# Patient Record
Sex: FEMALE | Race: ASIAN | NOT HISPANIC OR LATINO
[De-identification: names, ages, dates, MRNs, and addresses within clinical notes are randomized per-mention and may not be internally consistent; named-entity substitution may affect disease eponyms.]

---

## 2020-07-13 PROBLEM — Z00.00 ENCOUNTER FOR PREVENTIVE HEALTH EXAMINATION: Status: ACTIVE | Noted: 2020-07-13

## 2020-07-14 ENCOUNTER — APPOINTMENT (OUTPATIENT)
Dept: OBGYN | Facility: CLINIC | Age: 35
End: 2020-07-14
Payer: MEDICAID

## 2020-07-14 VITALS
SYSTOLIC BLOOD PRESSURE: 119 MMHG | WEIGHT: 144 LBS | TEMPERATURE: 98 F | BODY MASS INDEX: 28.27 KG/M2 | HEIGHT: 60 IN | DIASTOLIC BLOOD PRESSURE: 79 MMHG | HEART RATE: 85 BPM

## 2020-07-14 DIAGNOSIS — Z83.3 FAMILY HISTORY OF DIABETES MELLITUS: ICD-10-CM

## 2020-07-14 DIAGNOSIS — N96 RECURRENT PREGNANCY LOSS: ICD-10-CM

## 2020-07-14 DIAGNOSIS — Z80.3 FAMILY HISTORY OF MALIGNANT NEOPLASM OF BREAST: ICD-10-CM

## 2020-07-14 DIAGNOSIS — Z78.9 OTHER SPECIFIED HEALTH STATUS: ICD-10-CM

## 2020-07-14 DIAGNOSIS — Z87.59 PERSONAL HISTORY OF OTHER COMPLICATIONS OF PREGNANCY, CHILDBIRTH AND THE PUERPERIUM: ICD-10-CM

## 2020-07-14 DIAGNOSIS — Z01.419 ENCOUNTER FOR GYNECOLOGICAL EXAMINATION (GENERAL) (ROUTINE) W/OUT ABNORMAL FINDINGS: ICD-10-CM

## 2020-07-14 DIAGNOSIS — N98.1 HYPERSTIMULATION OF OVARIES: ICD-10-CM

## 2020-07-14 PROCEDURE — 99203 OFFICE O/P NEW LOW 30 MIN: CPT | Mod: TH

## 2020-07-19 PROBLEM — N96 HISTORY OF RECURRENT MISCARRIAGES, NOT CURRENTLY PREGNANT: Status: RESOLVED | Noted: 2020-07-19 | Resolved: 2020-07-19

## 2020-07-19 PROBLEM — Z78.9 EXERCISES OCCASIONALLY: Status: ACTIVE | Noted: 2020-07-14

## 2020-07-19 PROBLEM — N98.1 OVARIAN HYPERSTIMULATION SYNDROME: Status: RESOLVED | Noted: 2020-07-19 | Resolved: 2020-07-19

## 2020-07-19 PROBLEM — Z80.3 FAMILY HISTORY OF MALIGNANT NEOPLASM OF BREAST: Status: ACTIVE | Noted: 2020-07-14

## 2020-07-19 PROBLEM — Z83.3 FAMILY HISTORY OF DIABETES MELLITUS: Status: ACTIVE | Noted: 2020-07-14

## 2020-07-19 PROBLEM — Z87.59 HISTORY OF ECTOPIC PREGNANCY: Status: RESOLVED | Noted: 2020-07-19 | Resolved: 2020-07-19

## 2020-07-19 RX ORDER — FOLIC ACID 20 MG
CAPSULE ORAL
Refills: 0 | Status: ACTIVE | COMMUNITY

## 2020-07-19 NOTE — HISTORY OF PRESENT ILLNESS
[Sexually Active] : is sexually active [Definite:  ___ (Date)] : the last menstrual period was [unfilled] [Menarche Age: ____] : age at menarche was [unfilled] [Male ___] : [unfilled] male [Regular Cycle Intervals] : periods have been regular [Monogamous] : is monogamous [Contraception] : does not use contraception

## 2020-07-19 NOTE — CHIEF COMPLAINT
[Initial Visit] : initial GYN visit [FreeTextEntry1] : Pt is here for initial visit. Overall pt feels good, pt has c/o that during last periods had pelvic pain and had more blood clots then usual.

## 2020-07-19 NOTE — COUNSELING
[Vitamins/Supplements] : vitamins/supplements [Exercise] : exercise [Nutrition] : nutrition [Contraception] : contraception [STD (testing, results, tx)] : STD (testing, results, tx) [Safe Sexual Practices] : safe sexual practices [Preconception Care] : preconception care

## 2020-07-19 NOTE — REVIEW OF SYSTEMS
[Frequency] : frequency [Nl] : Endocrine [Dysuria] : no dysuria [Incontinence] : no incontinence [Pelvic Pain] : no pelvic pain [Abn Vag Bleeding] : no abnormal vaginal bleeding [Urgency] : no urgency

## 2020-07-19 NOTE — PHYSICAL EXAM
[No Lesions] : no genitalia lesions [Normal] : external genitalia [Labia Majora] : labia major [Labia Minora] : labia minora [No Bleeding] : there was no active vaginal bleeding [Uterine Adnexae] : were not tender and not enlarged [Discharge] : had no discharge [Motion Tenderness] : there was no cervical motion tenderness [Tenderness] : nontender [Enlarged ___ wks] : not enlarged [Mass ___ cm] : no uterine mass was palpated

## 2020-09-22 ENCOUNTER — APPOINTMENT (OUTPATIENT)
Dept: OBGYN | Facility: CLINIC | Age: 35
End: 2020-09-22

## 2020-12-23 PROBLEM — Z01.419 ENCOUNTER FOR GYNECOLOGICAL EXAMINATION: Status: RESOLVED | Noted: 2020-07-19 | Resolved: 2020-12-23

## 2021-07-07 ENCOUNTER — APPOINTMENT (OUTPATIENT)
Dept: OBGYN | Facility: CLINIC | Age: 36
End: 2021-07-07
Payer: MEDICAID

## 2021-07-07 VITALS
HEIGHT: 60 IN | TEMPERATURE: 98.1 F | DIASTOLIC BLOOD PRESSURE: 79 MMHG | BODY MASS INDEX: 27.88 KG/M2 | HEART RATE: 82 BPM | SYSTOLIC BLOOD PRESSURE: 118 MMHG | WEIGHT: 142 LBS

## 2021-07-07 DIAGNOSIS — Z01.419 ENCOUNTER FOR GYNECOLOGICAL EXAMINATION (GENERAL) (ROUTINE) W/OUT ABNORMAL FINDINGS: ICD-10-CM

## 2021-07-07 PROCEDURE — 99395 PREV VISIT EST AGE 18-39: CPT

## 2021-07-09 LAB
C TRACH RRNA SPEC QL NAA+PROBE: NOT DETECTED
ESTRADIOL SERPL-MCNC: 123 PG/ML
FSH SERPL-MCNC: 3.1 IU/L
HPV HIGH+LOW RISK DNA PNL CVX: NOT DETECTED
LH SERPL-ACNC: 4.9 IU/L
N GONORRHOEA RRNA SPEC QL NAA+PROBE: NOT DETECTED
PROGEST SERPL-MCNC: 8.3 NG/ML
SOURCE AMPLIFICATION: NORMAL
TSH SERPL-ACNC: 2.51 UIU/ML

## 2021-07-12 LAB
TESTOST BND SERPL-MCNC: 4.3 PG/ML
TESTOSTERONE TOTAL S: 37 NG/DL

## 2021-07-13 LAB
SOURCE AMPLIFICATION: NORMAL
T VAGINALIS RRNA SPEC QL NAA+PROBE: NOT DETECTED

## 2021-07-27 LAB
CYTOLOGY CVX/VAG DOC THIN PREP: NORMAL
ESTROGEN SERPL-MCNC: 312 PG/ML

## 2021-08-01 PROBLEM — Z01.419 ENCOUNTER FOR ANNUAL ROUTINE GYNECOLOGICAL EXAMINATION: Status: RESOLVED | Noted: 2021-08-01 | Resolved: 2021-08-15

## 2021-08-01 NOTE — HISTORY OF PRESENT ILLNESS
[Currently Active] : currently active [Men] : men [N] : Patient does not use contraception [Y] : Positive pregnancy history [Normal Amount/Duration] :  normal amount and duration [Regular Cycle Intervals] : periods have been regular [Frequency: Q ___ days] : menstrual periods occur approximately every [unfilled] days [Menarche Age: ____] : age at menarche was [unfilled] [Gonorrhea test offered] : Gonorrhea test offered [Chlamydia test offered] : Chlamydia test offered [Trichomonas test offered] : Trichomonas test offered [HPV test offered] : HPV test offered [No] : No [TextBox_4] : 37yo  with LMP 6/20/2021 came fro annual GYN exam and pap smear.  She also has concerns about fertility and her painful periods due to fibroids.  She denies discharge or dysuria.  She denies h/o abnl doug/HPV or STDs.  She is sexually active with one male partner- , has been trying to conceive without success, planning to start IVF again in future.  \par \par Patient had genetic testing done with previous [facility], unsure of what all was tested.  Patient unsure if hereditary cancer gene testing was done, recommended to do since mother has breast cancer at 35yoa.  Will request records and do all tests that were not previously looked at due to multiple SAB as well as hereditary disease.   [LMPDate] : 6/20/21 [MensesFreq] : 28 [MensesLength] : 5 [MensesAmount] : nl flow  [PGxTotal] : 6 [PGHxFullTerm] : 0 [PGHxPremature] : 0 [PGHxAbortions] : 6 [[facility]xLiving] : 0 [PGHxABInduced] : 0 [PGHxABSpont] : 5 [PGxEctopic] : 1 [PGHxMultBirths] : 0 [FreeTextEntry1] : 6/20/21

## 2021-08-01 NOTE — COUNSELING
[Nutrition/ Exercise/ Weight Management] : nutrition, exercise, weight management [Vitamins/Supplements] : vitamins/supplements [Breast Self Exam] : breast self exam [Bladder Hygiene] : bladder hygiene [Contraception/ Emergency Contraception/ Safe Sexual Practices] : contraception, emergency contraception, safe sexual practices [Preconception Care/ Fertility options] : preconception care, fertility options [STD (testing, results, tx)] : STD (testing, results, tx) [Medication Management] : medication management

## 2021-08-01 NOTE — PHYSICAL EXAM
[Appropriately responsive] : appropriately responsive [Alert] : alert [No Acute Distress] : no acute distress [No Lymphadenopathy] : no lymphadenopathy [Regular Rate Rhythm] : regular rate rhythm [Soft] : soft [Non-tender] : non-tender [Non-distended] : non-distended [No Mass] : no mass [Oriented x3] : oriented x3 [Examination Of The Breasts] : a normal appearance [No Discharge] : no discharge [No Masses] : no breast masses were palpable [No Lesions] : no lesions  [Labia Majora] : normal [Labia Minora] : normal [No Bleeding] : There was no active vaginal bleeding [Normal] : normal [Tenderness] : nontender [Enlarged ___ wks] : not enlarged [Mass ___ cm] : no uterine mass was palpated [Uterine Adnexae] : normal [FreeTextEntry5] : pap smear done

## 2021-08-01 NOTE — DISCUSSION/SUMMARY
[FreeTextEntry1] : A: 35yo for annual GYN exam, dysmenorrhea, fibroids, infertility, BMI 27\par \par P: GYN exam done\par      pap sear done\par      hormones\par      referral for pelvic sonogram\par      referral for mammogram/sonogram\par      request records\par      recommend hereditary cancer screen testing if not done\par      preconception counseling done\par      pain and bleeding precautions\par      f/u after imaging or PRN

## 2021-09-01 LAB — ANTI-MUELLERIAN HORMONE: 2.59 NG/ML

## 2021-09-09 LAB — HCG SERPL-MCNC: <0.6 MIU/ML

## 2022-05-31 ENCOUNTER — APPOINTMENT (OUTPATIENT)
Dept: OBGYN | Facility: CLINIC | Age: 37
End: 2022-05-31

## 2022-05-31 VITALS
WEIGHT: 134 LBS | TEMPERATURE: 97.2 F | SYSTOLIC BLOOD PRESSURE: 89 MMHG | BODY MASS INDEX: 26.31 KG/M2 | HEART RATE: 71 BPM | DIASTOLIC BLOOD PRESSURE: 57 MMHG | HEIGHT: 60 IN

## 2022-05-31 PROCEDURE — 99214 OFFICE O/P EST MOD 30 MIN: CPT | Mod: TH

## 2022-07-16 NOTE — DISCUSSION/SUMMARY
[FreeTextEntry1] : A: 37-year-old with fibroid uterus and surgical consultation, dysmenorrhea and heavy menses, fertility counseling, history of recurrent SAB, BMI 26\par \par P: Surgical and preconception counseling done\par MRI referral given\par Encourage [facility] follow-up for fertility planning\par Pain and bleeding precautions\par Encourage healthy diet and exercise\par Follow-up after imaging or for routine exam or as needed

## 2022-07-16 NOTE — HISTORY OF PRESENT ILLNESS
[Menarche Age: ____] : age at menarche was [unfilled] [Currently Active] : currently active [Men] : men [No] : No [Normal Amount/Duration] :  normal amount and duration [Regular Cycle Intervals] : periods have been regular [Frequency: Q ___ days] : menstrual periods occur approximately every [unfilled] days [Patient reported PAP Smear was normal] : Patient reported PAP Smear was normal [TextBox_4] : 37-year-old para 0-0-6-0 with LMP 5/30/2022 came for consultation regarding fibroids and possible myomectomy to help with her fertility.  She states her menses has been coming monthly however she gets pain near the end of her menses more often.  She has a known fibroid uterus and has gone through previous assisted fertility with no success.  They had previously had genetic infertility work-up with 9 frozen embryos available.  Last evaluation with [facility] was prior to 2020.  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  There were no other fibroids noted and patient was counseled that this may not hinder fertility and surgery could cause further complications potentially.  We did discuss getting a pelvic MRI to assess her uterus and fibroid further to evaluate whether myomectomy would be in her best interest prior to IVF and assisted fertility.  I also encourage patient to follow-up with [facility] regarding this and how they feel about the best options for her assisted fertility and use of her embryos.  Patient and  had many questions which were answered satisfactorily.  Patient is in agreement with management plan and given referral for pelvic MRI and states will make an appointment with [facility] in the meantime. [PapSmeardate] : 7/2021 [FreeTextEntry1] : 5/30/22

## 2022-07-16 NOTE — COUNSELING
[Nutrition/ Exercise/ Weight Management] : nutrition, exercise, weight management [Vitamins/Supplements] : vitamins/supplements [Preconception Care/ Fertility options] : preconception care, fertility options [Lab Results] : lab results [Medication Management] : medication management [Pre/Post Op Instructions] : pre/post op instructions

## 2022-07-16 NOTE — PHYSICAL EXAM
[Appropriately responsive] : appropriately responsive [Alert] : alert [No Acute Distress] : no acute distress [Oriented x3] : oriented x3 [FreeTextEntry2] : Patient declined exam today due to no current complaints and has scheduled annual exam in July 2022

## 2022-07-19 ENCOUNTER — NON-APPOINTMENT (OUTPATIENT)
Age: 37
End: 2022-07-19

## 2022-08-25 ENCOUNTER — APPOINTMENT (OUTPATIENT)
Dept: OBGYN | Facility: CLINIC | Age: 37
End: 2022-08-25

## 2022-08-25 DIAGNOSIS — N64.4 MASTODYNIA: ICD-10-CM

## 2022-08-25 PROCEDURE — 99442: CPT

## 2022-08-28 PROBLEM — N64.4 BREAST PAIN: Status: ACTIVE | Noted: 2022-08-28

## 2022-09-02 ENCOUNTER — ASOB RESULT (OUTPATIENT)
Age: 37
End: 2022-09-02

## 2022-09-02 ENCOUNTER — APPOINTMENT (OUTPATIENT)
Dept: OBGYN | Facility: CLINIC | Age: 37
End: 2022-09-02
Payer: MEDICAID

## 2022-09-02 ENCOUNTER — APPOINTMENT (OUTPATIENT)
Dept: OBGYN | Facility: CLINIC | Age: 37
End: 2022-09-02

## 2022-09-02 VITALS
HEART RATE: 93 BPM | WEIGHT: 138 LBS | TEMPERATURE: 97.7 F | SYSTOLIC BLOOD PRESSURE: 110 MMHG | HEIGHT: 60 IN | DIASTOLIC BLOOD PRESSURE: 72 MMHG | BODY MASS INDEX: 27.09 KG/M2

## 2022-09-02 DIAGNOSIS — Z86.018 PERSONAL HISTORY OF OTHER BENIGN NEOPLASM: ICD-10-CM

## 2022-09-02 DIAGNOSIS — Z12.4 ENCOUNTER FOR SCREENING FOR MALIGNANT NEOPLASM OF CERVIX: ICD-10-CM

## 2022-09-02 LAB
BILIRUB UR QL STRIP: NORMAL
CLARITY UR: CLEAR
COLLECTION METHOD: NORMAL
GLUCOSE UR-MCNC: NORMAL
HCG UR QL: 0.2 EU/DL
HCG UR QL: POSITIVE
HGB UR QL STRIP.AUTO: NORMAL
KETONES UR-MCNC: NORMAL
LEUKOCYTE ESTERASE UR QL STRIP: ABNORMAL
NITRITE UR QL STRIP: NORMAL
PH UR STRIP: 7
PROT UR STRIP-MCNC: NORMAL
QUALITY CONTROL: YES
SP GR UR STRIP: 1.01

## 2022-09-02 PROCEDURE — 76830 TRANSVAGINAL US NON-OB: CPT | Mod: 59

## 2022-09-02 PROCEDURE — 99395 PREV VISIT EST AGE 18-39: CPT

## 2022-09-02 PROCEDURE — ZZZZZ: CPT

## 2022-09-02 PROCEDURE — 81025 URINE PREGNANCY TEST: CPT

## 2022-09-02 PROCEDURE — 81003 URINALYSIS AUTO W/O SCOPE: CPT | Mod: QW

## 2022-09-02 RX ORDER — CEPHALEXIN 500 MG/1
500 CAPSULE ORAL 4 TIMES DAILY
Qty: 28 | Refills: 0 | Status: ACTIVE | COMMUNITY
Start: 2022-09-02 | End: 1900-01-01

## 2022-09-02 RX ORDER — KRILL/OM-3/DHA/EPA/PHOSPHO/AST 1000-230MG
81 CAPSULE ORAL DAILY
Qty: 30 | Refills: 8 | Status: ACTIVE | COMMUNITY
Start: 2022-09-02 | End: 1900-01-01

## 2022-09-02 NOTE — REASON FOR VISIT
[Follow-Up] : a follow-up evaluation of [FreeTextEntry2] : confirm pregnancy  [Annual] : an annual visit.

## 2022-09-06 ENCOUNTER — APPOINTMENT (OUTPATIENT)
Dept: OBGYN | Facility: CLINIC | Age: 37
End: 2022-09-06
Payer: MEDICAID

## 2022-09-06 VITALS
HEIGHT: 60 IN | WEIGHT: 139 LBS | TEMPERATURE: 98.6 F | DIASTOLIC BLOOD PRESSURE: 71 MMHG | BODY MASS INDEX: 27.29 KG/M2 | SYSTOLIC BLOOD PRESSURE: 106 MMHG | HEART RATE: 74 BPM

## 2022-09-06 DIAGNOSIS — R30.0 DYSURIA: ICD-10-CM

## 2022-09-06 LAB
ABO + RH PNL BLD: NORMAL
ANION GAP SERPL CALC-SCNC: 10 MMOL/L
BACTERIA UR CULT: NORMAL
BASOPHILS # BLD AUTO: 0.02 K/UL
BASOPHILS NFR BLD AUTO: 0.3 %
BILIRUB UR QL STRIP: NORMAL
BLD GP AB SCN SERPL QL: NORMAL
BUN SERPL-MCNC: 6 MG/DL
CALCIUM SERPL-MCNC: 10.1 MG/DL
CHLORIDE SERPL-SCNC: 103 MMOL/L
CLARITY UR: CLEAR
CO2 SERPL-SCNC: 24 MMOL/L
COLLECTION METHOD: NORMAL
CREAT SERPL-MCNC: 0.5 MG/DL
EGFR: 125 ML/MIN/1.73M2
EOSINOPHIL # BLD AUTO: 0.05 K/UL
EOSINOPHIL NFR BLD AUTO: 0.8 %
GLUCOSE SERPL-MCNC: 75 MG/DL
GLUCOSE UR-MCNC: NORMAL
HCG SERPL-MCNC: ABNORMAL MIU/ML
HCG UR QL: 0.2 EU/DL
HCT VFR BLD CALC: 41.4 %
HGB BLD-MCNC: 12.7 G/DL
HGB UR QL STRIP.AUTO: ABNORMAL
IMM GRANULOCYTES NFR BLD AUTO: 0.2 %
KETONES UR-MCNC: NORMAL
LEUKOCYTE ESTERASE UR QL STRIP: ABNORMAL
LYMPHOCYTES # BLD AUTO: 1.83 K/UL
LYMPHOCYTES NFR BLD AUTO: 27.9 %
MAN DIFF?: NORMAL
MCHC RBC-ENTMCNC: 28.5 PG
MCHC RBC-ENTMCNC: 30.7 G/DL
MCV RBC AUTO: 93 FL
MONOCYTES # BLD AUTO: 0.52 K/UL
MONOCYTES NFR BLD AUTO: 7.9 %
NEUTROPHILS # BLD AUTO: 4.14 K/UL
NEUTROPHILS NFR BLD AUTO: 62.9 %
NITRITE UR QL STRIP: NORMAL
PH UR STRIP: 7
PLATELET # BLD AUTO: 229 K/UL
POTASSIUM SERPL-SCNC: 5.2 MMOL/L
PROGEST SERPL-MCNC: 14.2 NG/ML
PROT UR STRIP-MCNC: NORMAL
RBC # BLD: 4.45 M/UL
RBC # FLD: 14.5 %
SODIUM SERPL-SCNC: 137 MMOL/L
SP GR UR STRIP: 1.01
WBC # FLD AUTO: 6.57 K/UL

## 2022-09-06 PROCEDURE — 76830 TRANSVAGINAL US NON-OB: CPT | Mod: 59

## 2022-09-06 PROCEDURE — 99214 OFFICE O/P EST MOD 30 MIN: CPT | Mod: TH

## 2022-09-06 PROCEDURE — 81003 URINALYSIS AUTO W/O SCOPE: CPT | Mod: QW

## 2022-09-06 RX ORDER — HUMAN RHO(D) IMMUNE GLOBULIN 300 UG/1
1500 INJECTION, SOLUTION INTRAMUSCULAR
Refills: 0 | Status: COMPLETED | OUTPATIENT
Start: 2022-09-06

## 2022-09-06 RX ADMIN — HUMAN RHO(D) IMMUNE GLOBULIN 0 UNIT: 300 INJECTION, SOLUTION INTRAMUSCULAR at 00:00

## 2022-09-08 LAB
A VAGINAE DNA VAG QL NAA+PROBE: NORMAL
BVAB2 DNA VAG QL NAA+PROBE: NORMAL
C KRUSEI DNA VAG QL NAA+PROBE: NEGATIVE
C TRACH RRNA SPEC QL NAA+PROBE: NEGATIVE
HCG SERPL-MCNC: ABNORMAL MIU/ML
HPV HIGH+LOW RISK DNA PNL CVX: NOT DETECTED
MEGA1 DNA VAG QL NAA+PROBE: NORMAL
N GONORRHOEA RRNA SPEC QL NAA+PROBE: NEGATIVE
PROGEST SERPL-MCNC: 16 NG/ML
T VAGINALIS RRNA SPEC QL NAA+PROBE: NEGATIVE

## 2022-09-14 LAB — CYTOLOGY CVX/VAG DOC THIN PREP: NORMAL

## 2022-09-20 ENCOUNTER — APPOINTMENT (OUTPATIENT)
Dept: OBGYN | Facility: CLINIC | Age: 37
End: 2022-09-20
Payer: MEDICAID

## 2022-09-20 ENCOUNTER — LABORATORY RESULT (OUTPATIENT)
Age: 37
End: 2022-09-20

## 2022-09-20 VITALS
SYSTOLIC BLOOD PRESSURE: 93 MMHG | HEART RATE: 83 BPM | BODY MASS INDEX: 27.48 KG/M2 | HEIGHT: 60 IN | TEMPERATURE: 97.6 F | DIASTOLIC BLOOD PRESSURE: 66 MMHG | WEIGHT: 140 LBS

## 2022-09-20 DIAGNOSIS — Z31.69 ENCOUNTER FOR OTHER GENERAL COUNSELING AND ADVICE ON PROCREATION: ICD-10-CM

## 2022-09-20 DIAGNOSIS — Z71.89 OTHER SPECIFIED COUNSELING: ICD-10-CM

## 2022-09-20 LAB
BILIRUB UR QL STRIP: NORMAL
CLARITY UR: CLEAR
COLLECTION METHOD: NORMAL
GLUCOSE UR-MCNC: NORMAL
HCG UR QL: 0.2 EU/DL
HGB UR QL STRIP.AUTO: NORMAL
KETONES UR-MCNC: NORMAL
LEUKOCYTE ESTERASE UR QL STRIP: NORMAL
NITRITE UR QL STRIP: NORMAL
PH UR STRIP: 6.5
PROT UR STRIP-MCNC: NORMAL
SP GR UR STRIP: <=1.005

## 2022-09-20 PROCEDURE — 81003 URINALYSIS AUTO W/O SCOPE: CPT | Mod: QW

## 2022-09-20 PROCEDURE — 99212 OFFICE O/P EST SF 10 MIN: CPT | Mod: TH

## 2022-09-20 NOTE — HISTORY OF PRESENT ILLNESS
[Normal Amount/Duration] :  normal amount and duration [Regular Cycle Intervals] : periods have been regular [Menarche Age: ____] : age at menarche was [unfilled] [No] : Patient does not have concerns regarding sex [Currently Active] : currently active [Frequency: Q ___ days] : menstrual periods occur approximately every [unfilled] days [HCG+: ___(Date)] : a positive HCG was recorded on [unfilled] [FreeTextEntry1] : 7/25/21

## 2022-09-21 LAB
25(OH)D3 SERPL-MCNC: 29 NG/ML
ABO + RH PNL BLD: NORMAL
ANION GAP SERPL CALC-SCNC: 13 MMOL/L
BASOPHILS # BLD AUTO: 0.02 K/UL
BASOPHILS NFR BLD AUTO: 0.2 %
BLD GP AB SCN SERPL QL: NORMAL
BUN SERPL-MCNC: 9 MG/DL
CALCIUM SERPL-MCNC: 10 MG/DL
CHLORIDE SERPL-SCNC: 101 MMOL/L
CMV IGG SERPL QL: >10 U/ML
CMV IGG SERPL-IMP: POSITIVE
CMV IGM SERPL QL: <8 AU/ML
CMV IGM SERPL QL: NEGATIVE
CO2 SERPL-SCNC: 22 MMOL/L
CREAT SERPL-MCNC: 0.5 MG/DL
EGFR: 125 ML/MIN/1.73M2
EOSINOPHIL # BLD AUTO: 0.05 K/UL
EOSINOPHIL NFR BLD AUTO: 0.6 %
ESTIMATED AVERAGE GLUCOSE: 103 MG/DL
GLUCOSE SERPL-MCNC: 84 MG/DL
HBA1C MFR BLD HPLC: 5.2 %
HBV SURFACE AG SER QL: NONREACTIVE
HCG SERPL-MCNC: ABNORMAL MIU/ML
HCT VFR BLD CALC: 37.7 %
HCV AB SER QL: NONREACTIVE
HCV S/CO RATIO: 0.1 S/CO
HGB BLD-MCNC: 11.9 G/DL
HIV1+2 AB SPEC QL IA.RAPID: NONREACTIVE
HSV 1+2 IGG SER IA-IMP: NEGATIVE
HSV 1+2 IGG SER IA-IMP: POSITIVE
HSV1 IGG SER QL: 55.2 INDEX
HSV2 IGG SER QL: 0.06 INDEX
IMM GRANULOCYTES NFR BLD AUTO: 0.2 %
LYMPHOCYTES # BLD AUTO: 2.3 K/UL
LYMPHOCYTES NFR BLD AUTO: 27.1 %
MAN DIFF?: NORMAL
MCHC RBC-ENTMCNC: 29.2 PG
MCHC RBC-ENTMCNC: 31.6 G/DL
MCV RBC AUTO: 92.4 FL
MEV IGG FLD QL IA: >300 AU/ML
MEV IGG+IGM SER-IMP: POSITIVE
MONOCYTES # BLD AUTO: 0.51 K/UL
MONOCYTES NFR BLD AUTO: 6 %
MUV AB SER-ACNC: POSITIVE
MUV IGG SER QL IA: 95.3 AU/ML
NEUTROPHILS # BLD AUTO: 5.6 K/UL
NEUTROPHILS NFR BLD AUTO: 65.9 %
PLATELET # BLD AUTO: 238 K/UL
POTASSIUM SERPL-SCNC: 4.6 MMOL/L
PROGEST SERPL-MCNC: 12.2 NG/ML
RBC # BLD: 4.08 M/UL
RBC # FLD: 14.5 %
RUBV IGG FLD-ACNC: 21.2 INDEX
RUBV IGG SER-IMP: POSITIVE
SODIUM SERPL-SCNC: 136 MMOL/L
T GONDII AB SER-IMP: NEGATIVE
T GONDII AB SER-IMP: NEGATIVE
T GONDII IGG SER QL: <3 IU/ML
T GONDII IGM SER QL: <3 AU/ML
T PALLIDUM AB SER QL IA: NEGATIVE
TSH SERPL-ACNC: 1.78 UIU/ML
VZV AB TITR SER: POSITIVE
VZV IGG SER IF-ACNC: 666.7 INDEX
WBC # FLD AUTO: 8.5 K/UL

## 2022-09-22 LAB
BACTERIA UR CULT: NORMAL
HGB A MFR BLD: 97.2 %
HGB A2 MFR BLD: 2.8 %
HGB FRACT BLD-IMP: NORMAL
LEAD BLD-MCNC: <1 UG/DL

## 2022-09-22 RX ORDER — UBIDECARENONE/VIT E ACET 100MG-5
25 MCG CAPSULE ORAL
Qty: 30 | Refills: 2 | Status: ACTIVE | COMMUNITY
Start: 2022-09-22 | End: 1900-01-01

## 2022-09-23 LAB
B19V IGG SER QL IA: 0.39 INDEX
B19V IGG+IGM SER-IMP: NEGATIVE
B19V IGG+IGM SER-IMP: NORMAL
B19V IGM FLD-ACNC: 0.1 INDEX
B19V IGM SER-ACNC: NEGATIVE

## 2022-09-24 LAB — FMR1 GENE MUT ANL BLD/T: NORMAL

## 2022-09-25 PROBLEM — Z12.4 ENCOUNTER FOR SCREENING FOR CERVICAL CANCER: Status: ACTIVE | Noted: 2021-07-07

## 2022-09-25 PROBLEM — Z86.018 HISTORY OF FIBROID: Status: RESOLVED | Noted: 2022-09-25 | Resolved: 2022-09-25

## 2022-09-25 RX ORDER — NAPROXEN 500 MG/1
500 TABLET ORAL 3 TIMES DAILY
Qty: 90 | Refills: 0 | Status: DISCONTINUED | COMMUNITY
Start: 2020-07-19 | End: 2022-09-25

## 2022-09-25 RX ORDER — FLUCONAZOLE 150 MG/1
150 TABLET ORAL
Qty: 1 | Refills: 0 | Status: DISCONTINUED | COMMUNITY
Start: 2021-09-16 | End: 2022-09-25

## 2022-09-25 NOTE — HISTORY OF PRESENT ILLNESS
[Patient reported PAP Smear was normal] : Patient reported PAP Smear was normal [N] : Patient does not use contraception [Y] : Positive pregnancy history [Normal Amount/Duration] :  normal amount and duration [Regular Cycle Intervals] : periods have been regular [Frequency: Q ___ days] : menstrual periods occur approximately every [unfilled] days [Menarche Age: ____] : age at menarche was [unfilled] [HCG+: ___(Date)] : a positive HCG was recorded on [unfilled] [Currently Active] : currently active [Men] : men [No] : No [Gonorrhea test offered] : Gonorrhea test offered [Chlamydia test offered] : Chlamydia test offered [Trichomonas test offered] : Trichomonas test offered [HPV test offered] : HPV test offered [TextBox_4] : 37-year-old para 0-0-6-0 with LMP 7/25/2022 came for annual GYN exam and Pap smear and also to evaluate for pregnancy due to home pregnancy test positive.  She states she has been having a lot of pelvic pain and cramping and had spotting a couple days ago.  She denies any discharge but does have some dysuria.  She denies history of abnormal Pap, HPV, STDs or cysts.  She does have a known history of fibroid uterus.  She is sexually active with 1 male partner-.  Patient has been contemplating restarting her IVF at our last discussion however this was a natural pregnancy.\par \par UCG positive\par U dip moderate leukocytes [PapSmeardate] : 2021 [LMPDate] : 7/25/22 [MensesFreq] : 28 [MensesLength] : 6 [MensesAmount] : heavy to light  [PGxTotal] : 6 [PGHxFullTerm] : 0 [PGHxPremature] : 0 [PGHxAbortions] : 6 [[facility]xLiving] : 0 [PGHxABInduced] : 0 [PGHxABSpont] : 6 [PGHxEctopic] : 0 [PGHxMultBirths] : 0 [FreeTextEntry1] : 7/25/22

## 2022-09-25 NOTE — PHYSICAL EXAM
[Appropriately responsive] : appropriately responsive [Alert] : alert [No Acute Distress] : no acute distress [No Lymphadenopathy] : no lymphadenopathy [Regular Rate Rhythm] : regular rate rhythm [Soft] : soft [Non-tender] : non-tender [Non-distended] : non-distended [No Mass] : no mass [Oriented x3] : oriented x3 [Examination Of The Breasts] : a normal appearance [No Discharge] : no discharge [No Masses] : no breast masses were palpable [No Lesions] : no lesions  [Labia Majora] : normal [Labia Minora] : normal [Normal] : normal [Scant] : There was scant vaginal bleeding [Old Blood] : old blood was present in the vagina [Uterine Adnexae] : normal [FreeTextEntry4] : Scant old blood in vault no active bleeding [FreeTextEntry5] : Pap smear done [FreeTextEntry6] : Mild suprapubic tenderness on bimanual exam, no rebound or guarding, uterus 12 to 14 weeks size enlarged from fibroid

## 2022-09-25 NOTE — DISCUSSION/SUMMARY
[FreeTextEntry1] : Transvaginal ultrasound done to evaluate pregnancy and viability.  Patient counseled that a gestational sac with yolk sac was noted however no fetal pole visualized.  There were no adnexal masses or free fluid appreciated.  Patient was counseled on possibility of early gestation versus possible Ms. due to her pain and spotting.  This is a very desired pregnancy so we will trend beta-hCG and strict pain and bleeding precautions given.  Due to patient's dysuria and leukocytes and pain will start treatment for UTI and send urine culture.  We discussed potentially giving RhoGAM however without fetal pole present and unsure of pregnancy viability we will do type and screen today to evaluate for antibodies already and give RhoGAM as needed.  Patient understood all counseling and all questions answered satisfactorily.  Patient will trend hCG and follow-up as recommended.\par \par A: 37-year-old for annual GYN exam, amenorrhea, bleeding in early pregnancy, fibroid uterus, dysuria, BMI 27\par \par P: GYN exam done\par Pap smear done\par hCG and basic labs done\par Safe sex practices\par Pain and bleeding precautions\par Tylenol pain management as needed\par Keflex Rx sent\par Continue prenatal vitamins and folic acid\par Increase oral hydration\par Follow-up 48 hours for hCG and sonogram as needed

## 2022-09-25 NOTE — COUNSELING
[Nutrition/ Exercise/ Weight Management] : nutrition, exercise, weight management [Vitamins/Supplements] : vitamins/supplements [Breast Self Exam] : breast self exam [Bladder Hygiene] : bladder hygiene [Contraception/ Emergency Contraception/ Safe Sexual Practices] : contraception, emergency contraception, safe sexual practices [Pregnancy Options] : pregnancy options [STD (testing, results, tx)] : STD (testing, results, tx) [Lab Results] : lab results [Medication Management] : medication management

## 2022-09-25 NOTE — HISTORY OF PRESENT ILLNESS
[Patient reported PAP Smear was normal] : Patient reported PAP Smear was normal [N] : Patient does not use contraception [Y] : Positive pregnancy history [Normal Amount/Duration] :  normal amount and duration [Regular Cycle Intervals] : periods have been regular [Frequency: Q ___ days] : menstrual periods occur approximately every [unfilled] days [Menarche Age: ____] : age at menarche was [unfilled] [HCG+: ___(Date)] : a positive HCG was recorded on [unfilled] [Currently Active] : currently active [Men] : men [No] : No [Gonorrhea test offered] : Gonorrhea test offered [Chlamydia test offered] : Chlamydia test offered [Trichomonas test offered] : Trichomonas test offered [HPV test offered] : HPV test offered [TextBox_4] : 37-year-old para 0-0-6-0 with LMP 7/25/2022 came for annual GYN exam and Pap smear and also to evaluate for pregnancy due to home pregnancy test positive.  She states she has been having a lot of pelvic pain and cramping and had spotting a couple days ago.  She denies any discharge but does have some dysuria.  She denies history of abnormal Pap, HPV, STDs or cysts.  She does have a known history of fibroid uterus.  She is sexually active with 1 male partner-.  Patient has been contemplating restarting her IVF at our last discussion however this was a natural pregnancy.\par \par UCG positive\par U dip moderate leukocytes [PapSmeardate] : 2021 [LMPDate] : 7/25/22 [MensesFreq] : 28 [MensesLength] : 6 [MensesAmount] : heavy to light  [PGxTotal] : 6 [PGHxFullTerm] : 0 [PGHxPremature] : 0 [PGHxAbortions] : 6 [[facility]xLiving] : 0 [PGHxABInduced] : 0 [PGHxABSpont] : 6 [PGHxEctopic] : 0 [PGHxMultBirths] : 0 [FreeTextEntry1] : 7/25/22

## 2022-09-26 PROBLEM — R30.0 DYSURIA: Status: ACTIVE | Noted: 2022-09-02

## 2022-09-26 LAB — AR GENE MUT ANL BLD/T: NORMAL

## 2022-09-26 NOTE — HISTORY OF PRESENT ILLNESS
[Normal Amount/Duration] :  normal amount and duration [Regular Cycle Intervals] : periods have been regular [Frequency: Q ___ days] : menstrual periods occur approximately every [unfilled] days [Menarche Age: ____] : age at menarche was [unfilled] [Currently Active] : currently active [Men] : men [No] : No [TextBox_4] : 37-year-old para 0-0-6-0 with LMP 7/25/2022 came for reevaluation of pregnancy viability.  Patient states she is still having some pain and pelvic pressure and also had some pink spotting this morning and then on and off today.  She also had some spotting on Saturday but not on Sunday.  Patient states she thinks she has some intermittent dysuria but difficult to differentiate because of the pelvic pain and cramping.  \par \par U dip: Microscopic hematuria, trace leukocytes [FreeTextEntry1] : 7/25/22

## 2022-09-26 NOTE — PHYSICAL EXAM
[Appropriately responsive] : appropriately responsive [Alert] : alert [No Acute Distress] : no acute distress [Regular Rate Rhythm] : regular rate rhythm [Soft] : soft [Non-tender] : non-tender [Non-distended] : non-distended [No Lesions] : no lesions [No Mass] : no mass [Oriented x3] : oriented x3

## 2022-09-26 NOTE — COUNSELING
[Nutrition/ Exercise/ Weight Management] : nutrition, exercise, weight management [Vitamins/Supplements] : vitamins/supplements [Bladder Hygiene] : bladder hygiene [Contraception/ Emergency Contraception/ Safe Sexual Practices] : contraception, emergency contraception, safe sexual practices [Pregnancy Options] : pregnancy options [STD (testing, results, tx)] : STD (testing, results, tx) [Lab Results] : lab results [Medication Management] : medication management

## 2022-10-02 LAB
CFTR MUT TESTED BLD/T: NEGATIVE
HSV1 IGM SER QL: NEGATIVE
HSV2 AB FLD-ACNC: NEGATIVE

## 2022-10-04 ENCOUNTER — APPOINTMENT (OUTPATIENT)
Dept: OBGYN | Facility: CLINIC | Age: 37
End: 2022-10-04
Payer: MEDICAID

## 2022-10-04 ENCOUNTER — ASOB RESULT (OUTPATIENT)
Age: 37
End: 2022-10-04

## 2022-10-04 ENCOUNTER — APPOINTMENT (OUTPATIENT)
Dept: OBGYN | Facility: CLINIC | Age: 37
End: 2022-10-04

## 2022-10-04 VITALS
SYSTOLIC BLOOD PRESSURE: 92 MMHG | BODY MASS INDEX: 27.29 KG/M2 | HEIGHT: 60 IN | WEIGHT: 139 LBS | DIASTOLIC BLOOD PRESSURE: 60 MMHG | HEART RATE: 77 BPM | TEMPERATURE: 97.7 F

## 2022-10-04 LAB
BILIRUB UR QL STRIP: NORMAL
CLARITY UR: CLEAR
COLLECTION METHOD: NORMAL
GLUCOSE UR-MCNC: NORMAL
HCG UR QL: 0.2 EU/DL
HGB UR QL STRIP.AUTO: NORMAL
KETONES UR-MCNC: NORMAL
LEUKOCYTE ESTERASE UR QL STRIP: NORMAL
NITRITE UR QL STRIP: NORMAL
PH UR STRIP: 8
PROT UR STRIP-MCNC: NORMAL
SP GR UR STRIP: 1.01

## 2022-10-04 PROCEDURE — 99213 OFFICE O/P EST LOW 20 MIN: CPT | Mod: TH

## 2022-10-04 PROCEDURE — 81003 URINALYSIS AUTO W/O SCOPE: CPT | Mod: QW

## 2022-10-04 PROCEDURE — 76817 TRANSVAGINAL US OBSTETRIC: CPT

## 2022-10-09 PROBLEM — Z71.89 ENCOUNTER FOR SURGICAL COUNSELING: Status: RESOLVED | Noted: 2022-07-16 | Resolved: 2022-10-09

## 2022-10-09 PROBLEM — Z31.69 INFERTILITY COUNSELING: Status: RESOLVED | Noted: 2021-07-07 | Resolved: 2022-10-09

## 2022-10-09 PROBLEM — Z31.69 ENCOUNTER FOR PRECONCEPTION CONSULTATION: Status: RESOLVED | Noted: 2020-07-19 | Resolved: 2022-10-09

## 2022-10-18 ENCOUNTER — ASOB RESULT (OUTPATIENT)
Age: 37
End: 2022-10-18

## 2022-10-18 ENCOUNTER — APPOINTMENT (OUTPATIENT)
Dept: ANTEPARTUM | Facility: CLINIC | Age: 37
End: 2022-10-18

## 2022-10-18 VITALS
DIASTOLIC BLOOD PRESSURE: 65 MMHG | HEIGHT: 60 IN | HEART RATE: 64 BPM | BODY MASS INDEX: 27.68 KG/M2 | WEIGHT: 141 LBS | SYSTOLIC BLOOD PRESSURE: 100 MMHG

## 2022-10-18 DIAGNOSIS — O09.519 SUPERVISION OF ELDERLY PRIMIGRAVIDA, UNSPECIFIED TRIMESTER: ICD-10-CM

## 2022-10-18 PROCEDURE — 76817 TRANSVAGINAL US OBSTETRIC: CPT

## 2022-10-18 PROCEDURE — 76813 OB US NUCHAL MEAS 1 GEST: CPT

## 2022-10-23 RX ORDER — INDOMETHACIN 25 MG/1
25 CAPSULE ORAL
Qty: 9 | Refills: 0 | Status: ACTIVE | COMMUNITY
Start: 2022-10-23 | End: 1900-01-01

## 2022-11-01 ENCOUNTER — ASOB RESULT (OUTPATIENT)
Age: 37
End: 2022-11-01

## 2022-11-01 ENCOUNTER — APPOINTMENT (OUTPATIENT)
Dept: OBGYN | Facility: CLINIC | Age: 37
End: 2022-11-01

## 2022-11-01 VITALS
BODY MASS INDEX: 27.68 KG/M2 | HEIGHT: 60 IN | TEMPERATURE: 97.7 F | WEIGHT: 141 LBS | SYSTOLIC BLOOD PRESSURE: 105 MMHG | HEART RATE: 88 BPM | DIASTOLIC BLOOD PRESSURE: 74 MMHG

## 2022-11-01 LAB
BILIRUB UR QL STRIP: NORMAL
CLARITY UR: CLEAR
COLLECTION METHOD: NORMAL
GLUCOSE UR-MCNC: NORMAL
HCG UR QL: 0.2 EU/DL
HGB UR QL STRIP.AUTO: NORMAL
KETONES UR-MCNC: NORMAL
LEUKOCYTE ESTERASE UR QL STRIP: NORMAL
NITRITE UR QL STRIP: NORMAL
PH UR STRIP: 7
PROT UR STRIP-MCNC: NORMAL
SP GR UR STRIP: 1.01

## 2022-11-01 PROCEDURE — 99214 OFFICE O/P EST MOD 30 MIN: CPT | Mod: TH

## 2022-11-01 PROCEDURE — ZZZZZ: CPT

## 2022-11-01 PROCEDURE — 81003 URINALYSIS AUTO W/O SCOPE: CPT | Mod: QW

## 2022-11-01 PROCEDURE — 76830 TRANSVAGINAL US NON-OB: CPT | Mod: 59

## 2022-11-15 ENCOUNTER — ASOB RESULT (OUTPATIENT)
Age: 37
End: 2022-11-15

## 2022-11-15 ENCOUNTER — APPOINTMENT (OUTPATIENT)
Dept: OBGYN | Facility: CLINIC | Age: 37
End: 2022-11-15
Payer: MEDICAID

## 2022-11-15 ENCOUNTER — RESULT CHARGE (OUTPATIENT)
Age: 37
End: 2022-11-15

## 2022-11-15 ENCOUNTER — APPOINTMENT (OUTPATIENT)
Dept: OBGYN | Facility: CLINIC | Age: 37
End: 2022-11-15

## 2022-11-15 ENCOUNTER — NON-APPOINTMENT (OUTPATIENT)
Age: 37
End: 2022-11-15

## 2022-11-15 VITALS
HEART RATE: 91 BPM | TEMPERATURE: 97.8 F | WEIGHT: 142 LBS | DIASTOLIC BLOOD PRESSURE: 64 MMHG | HEIGHT: 60 IN | BODY MASS INDEX: 27.88 KG/M2 | SYSTOLIC BLOOD PRESSURE: 97 MMHG

## 2022-11-15 DIAGNOSIS — O26.20 PREGNANCY CARE FOR PATIENT WITH RECURRENT PREGNANCY LOSS, UNSPECIFIED TRIMESTER: ICD-10-CM

## 2022-11-15 LAB
BILIRUB UR QL STRIP: NORMAL
CLARITY UR: CLEAR
COLLECTION METHOD: NORMAL
GLUCOSE UR-MCNC: NORMAL
HCG UR QL: 0.2 EU/DL
HGB UR QL STRIP.AUTO: NORMAL
KETONES UR-MCNC: NORMAL
LEUKOCYTE ESTERASE UR QL STRIP: ABNORMAL
NITRITE UR QL STRIP: NORMAL
PH UR STRIP: 7.5
PROT UR STRIP-MCNC: NORMAL
SP GR UR STRIP: 1.01

## 2022-11-15 PROCEDURE — ZZZZZ: CPT

## 2022-11-15 PROCEDURE — 81003 URINALYSIS AUTO W/O SCOPE: CPT | Mod: QW

## 2022-11-15 PROCEDURE — 76830 TRANSVAGINAL US NON-OB: CPT | Mod: 59

## 2022-11-15 PROCEDURE — 99213 OFFICE O/P EST LOW 20 MIN: CPT | Mod: TH

## 2022-11-15 NOTE — OB SUMMARY
[FreeTextEntry1] : Pt is here for her 16 week OB check-up, pt states she has pelvic pressure and cramping. \par \par wt- 142 ht- 5 temp- 97.8 b/p- 97/64 pulse- 91 pro- neg glu- neg  [de-identified] : sm

## 2022-11-17 LAB — BACTERIA UR CULT: NORMAL

## 2022-11-18 LAB
AFP MOM: 1.61
AFP VALUE: 59.4 NG/ML
ALPHA FETOPROTEIN SERUM COMMENT: NORMAL
ALPHA FETOPROTEIN SERUM INTERPRETATION: NORMAL
ALPHA FETOPROTEIN SERUM RESULTS: NORMAL
ALPHA FETOPROTEIN SERUM TEST RESULTS: NORMAL
GESTATIONAL AGE BASED ON: NORMAL
GESTATIONAL AGE ON COLLECTION DATE: 16.1 WEEKS
INSULIN DEP DIABETES: NO
MATERNAL AGE AT EDD AFP: 37 YR
MULTIPLE GESTATION: NO
OSBR RISK 1 IN: 2047
RACE: NORMAL
WEIGHT AFP: 142 LBS

## 2022-11-21 ENCOUNTER — ASOB RESULT (OUTPATIENT)
Age: 37
End: 2022-11-21

## 2022-11-21 ENCOUNTER — APPOINTMENT (OUTPATIENT)
Dept: MATERNAL FETAL MEDICINE | Facility: CLINIC | Age: 37
End: 2022-11-21

## 2022-11-21 ENCOUNTER — LABORATORY RESULT (OUTPATIENT)
Age: 37
End: 2022-11-21

## 2022-11-21 ENCOUNTER — APPOINTMENT (OUTPATIENT)
Dept: ANTEPARTUM | Facility: CLINIC | Age: 37
End: 2022-11-21

## 2022-11-21 VITALS
SYSTOLIC BLOOD PRESSURE: 110 MMHG | WEIGHT: 146 LBS | DIASTOLIC BLOOD PRESSURE: 64 MMHG | BODY MASS INDEX: 28.66 KG/M2 | HEIGHT: 60 IN | HEART RATE: 78 BPM

## 2022-11-21 PROCEDURE — 99215 OFFICE O/P EST HI 40 MIN: CPT | Mod: TH,25

## 2022-11-21 PROCEDURE — 76805 OB US >/= 14 WKS SNGL FETUS: CPT

## 2022-11-21 PROCEDURE — ZZZZZ: CPT

## 2022-11-21 NOTE — DISCUSSION/SUMMARY
[FreeTextEntry1] : Diane Dale\par \par 22\par Initial MFM Consult Note:\par 37y , Early Sab 4, Ectopic 1 is referred by Dr Reinoso for history of early pregnancy loss x5, and current pregnancy with symptomatic 13cm myoma.  Pt presented to [facility] on  for VB, rec’d RhIg at that time.  C/o chronic LAP & cramping before and during this pregnancy. \par \par PMHx:  	Denies DM, Chr Htn, Asthma. Never hospitalized.  No Hx SLE or other CTDz. No Hx VTE or abnl clotting. \par Sur: Ectopic pregnancy (more details needed)\par 	Denies any need for medical or surgical D&C with any of her pregnancy losses.  \par All occurred before 8wGA, spontaneous and complete. \par Meds:	ASA 81mg for one month in 1st tri, then D/C’d\par                 Progesterone vag suppos, ongoing. \par Allergies: NKDA\par SocHx: 	Arrived in [place] .  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 \par Lives with her  and his extended family in [place], [place].\par A 1yo is in the household.  Denies subst use x3. \par FHx: 	Pt and  are 1st cousins.  Mother has DM & Htn.  Denies other illnesses. \par OBHx: P0.\par GynHx: Long Hx of pelvic pain thought to be associated with her 13cm anterior LAURENT fibroid. Pt describes pain as starting on d4 of cycle, and continuing until next menses, suggesting possibility of adenomyosis also.  Outside of pregnancy pain is relieved by Naprosyn and warm baths, heating pads.   Due to recurrent pregnancy losses, she has undergone IUI x3 and IVF x1.  Pregnancy did not occur with any of these cycles.  All 6 pregnancies have been spontaneous.\par Vaccinations: Not discussed today.\par Family Planning: Not discussed.  \par ROS: N&V or pregnancy resolved. No LOF, VB x2w (although earlier episode on ).  ++LAP (right and suprapubic) of varying severity, most recently last night, but none today.  Minimal relief with acetaminophen.  \par \par Px: Pleasant but tired-looking woman in NAD.  Good historian.  Moves easily. \par VS: 110/64, HR 78, 146#  BMI 28.5. \par HENT: NC/AT\par Lungs: Clear to ausc bilat. No CVAT. \par Cor: S1/S2 nl, no mrb. Rrr.\par Abd: Fundus soft, tenderness over LAURENT. \par Extr: No CCE. \par SVE: NEFG, normal white discharge.  GBS R/V taken. \par \par LAB:  Bneg, Antib Neg.  HbEP AA. \par 	RPR/HIV/HBSAg/HCAb  }all NR. \par  	CMV IgG pos, IgM neg. \par 	Panorama NIPS risk reducing; MSAFP normal, NT normal. \par MFM US: \par : SFBreech, Plac Posterior, not low-lying. DVP 5.8cm. EFW shows normal interval growth.  Preliminary anatomic survey is normal. CL 4.1cm, normal.  Anterior LAURENT/fundal myoma again imaged. \par \par Impression/Recommendations:  38yo , Sab 4, Ect 1 at 17w0d (jameel 5 by LMP c/w serial US) with a large LAURENT myoma and chronic pain presents for MFM evaluation. \par 1.  Chronic Pain: Presentation is c/w expanding myoma, however adenomyosis could also be present. \par ->Recommend L&D evaluation for severe or unremitting pain and liberal use of indomethacin with overnight hospitalization in order to monitor fetal response.  \par ->Rx that has worked well  is Indomethacin 25mg po q 6h x 8 doses.  This may be repeated as long as amniotic fluid volume is monitored.  AFVol is a good approximation of ductal flow, which theoretically could constrict due to indomethacin Rx after 32w. \par Indomethacin also has the advantage of stopping/preventing contractions caused by inflammatory mediators released by fibroid necrosis.  \par 2. Recurrent pregnancy loss:  Pt states she has been evaluated for this in the past, most recently in 2016 prior to IVF cycle.  One phlebotomist told her that she "clotted easily" but she does not recall being told that she had a thrombophilia.  No Hx VTE. \par ->Re-check basic 3 labs recommended for women with no Hx VTE: Lupus anticoag, beta-2-glycoprotein & anticardiolipin antibodies. 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  NIPS is risk reducing, however carrier testing could be informative in the  period, for the planning of future pregnancies and the etiology of her muliple losses.\par ->Referral for  genetic counseling made.\par 4. Exposure to 1yo family member:  I advised Ms Quezada not to share implements or eat food off of the 1yo's plate, and to wash hands after changing diapers.  We note that pt is CMV IgG pos, IgM negative. \par \par RT 4w for f/u anatomic survey and f/u MFM Consult.      \par f/u with Dr Reinoso regarding lab results and possible ASA prophylaxis. \par \par Thank you for allowing us to be part of Ms QUEZADA's pregnancy care team. \par MD Js, FACOG

## 2022-12-16 ENCOUNTER — APPOINTMENT (OUTPATIENT)
Dept: OBGYN | Facility: CLINIC | Age: 37
End: 2022-12-16
Payer: COMMERCIAL

## 2022-12-16 ENCOUNTER — ASOB RESULT (OUTPATIENT)
Age: 37
End: 2022-12-16

## 2022-12-16 VITALS
HEIGHT: 60 IN | HEART RATE: 85 BPM | BODY MASS INDEX: 29.06 KG/M2 | WEIGHT: 148 LBS | SYSTOLIC BLOOD PRESSURE: 91 MMHG | TEMPERATURE: 97.8 F | DIASTOLIC BLOOD PRESSURE: 64 MMHG

## 2022-12-16 DIAGNOSIS — Z31.5 ENCOUNTER FOR PROCREATIVE GENETIC COUNSELING: ICD-10-CM

## 2022-12-16 DIAGNOSIS — O20.9 HEMORRHAGE IN EARLY PREGNANCY, UNSPECIFIED: ICD-10-CM

## 2022-12-16 DIAGNOSIS — Z23 ENCOUNTER FOR IMMUNIZATION: ICD-10-CM

## 2022-12-16 PROCEDURE — G0008: CPT

## 2022-12-16 PROCEDURE — 81003 URINALYSIS AUTO W/O SCOPE: CPT | Mod: QW

## 2022-12-16 PROCEDURE — 99213 OFFICE O/P EST LOW 20 MIN: CPT | Mod: TH,25

## 2022-12-16 PROCEDURE — 76817 TRANSVAGINAL US OBSTETRIC: CPT

## 2022-12-16 PROCEDURE — 90686 IIV4 VACC NO PRSV 0.5 ML IM: CPT

## 2022-12-16 PROCEDURE — 76817 TRANSVAGINAL US OBSTETRIC: CPT | Mod: 59

## 2022-12-16 PROCEDURE — 76805 OB US >/= 14 WKS SNGL FETUS: CPT

## 2022-12-20 LAB
BACTERIA UR CULT: NORMAL
BILIRUB UR QL STRIP: NORMAL
CLARITY UR: CLEAR
COLLECTION METHOD: NORMAL
GLUCOSE UR-MCNC: NORMAL
HCG UR QL: 0.2 EU/DL
HGB UR QL STRIP.AUTO: NORMAL
KETONES UR-MCNC: NORMAL
LEUKOCYTE ESTERASE UR QL STRIP: ABNORMAL
NITRITE UR QL STRIP: NORMAL
PH UR STRIP: 7
PROT UR STRIP-MCNC: NORMAL
SP GR UR STRIP: 1.01

## 2022-12-22 ENCOUNTER — APPOINTMENT (OUTPATIENT)
Dept: ANTEPARTUM | Facility: CLINIC | Age: 37
End: 2022-12-22
Payer: COMMERCIAL

## 2022-12-22 ENCOUNTER — APPOINTMENT (OUTPATIENT)
Dept: MATERNAL FETAL MEDICINE | Facility: CLINIC | Age: 37
End: 2022-12-22

## 2022-12-22 ENCOUNTER — ASOB RESULT (OUTPATIENT)
Age: 37
End: 2022-12-22

## 2022-12-22 VITALS
SYSTOLIC BLOOD PRESSURE: 100 MMHG | WEIGHT: 151 LBS | HEART RATE: 69 BPM | BODY MASS INDEX: 29.64 KG/M2 | DIASTOLIC BLOOD PRESSURE: 65 MMHG | HEIGHT: 60 IN

## 2022-12-22 PROCEDURE — 76811 OB US DETAILED SNGL FETUS: CPT

## 2022-12-22 PROCEDURE — 76817 TRANSVAGINAL US OBSTETRIC: CPT

## 2022-12-22 PROCEDURE — ZZZZZ: CPT

## 2022-12-22 NOTE — DISCUSSION/SUMMARY
[FreeTextEntry1] : 37y , Early Sab 4, Ectopic 1 is referred by Dr Reinoso for history of early pregnancy loss x5, and current pregnancy with symptomatic 13cm myoma. Pt presented to [facility] on  for VB, rec’d RhIg at that time. C/o chronic LAP & cramping before and during this pregnancy. \par \par PMHx: 	Denies DM, Chr Htn, Asthma. Never hospitalized. No Hx SLE or other CTDz. No Hx VTE or abnl clotting. \par Sur: Ectopic pregnancy \par 	Denies any need for medical or surgical D&C with any of her pregnancy losses. \par All occurred before 8wGA, spontaneous and complete. \par Meds:	ASA 81mg for one month in 1st tri, then D/C’d\par Progesterone vag suppos, ongoing. \par Allergies: NKDA\par SocHx: 	Arrived in [place] . 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 \par Lives with her  and his extended family in [place], [place].\par A 1yo is in the household. Denies subst use x3. \par FHx: 	Pt and  are 1st cousins. Mother has DM & Htn. Denies other illnesses. \par OBHx: P0.\par GynHx: Long Hx of pelvic pain thought to be associated with her 13cm anterior LAURENT fibroid. Due to recurrent pregnancy losses, she has undergone IUI x3 and IVF x1. Pregnancy did not occur with any of these cycles. All 6 pregnancies have been spontaneous.\par Vaccinations: Not discussed today.\par Family Planning: Not discussed. \par \par \par LAB: APLA labwork negative. \par \par MFM US: today CL is 3.7 cm. Anatomic survey WNL. \par \par Impression/Recommendations: 38yo , Sab 4, Ect 1 at 21w3d (jameel  by LMP c/w serial US) with a large LAURENT myoma and chronic pain presents for follow-up\par 1. Chronic Pain: pain much improved after indomethacin course. Gets mild pain from time to time. \par 2. Recurrent pregnancy loss: unexplained. APLA work-up negative. \par 3. 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 NIPS is risk reducing, however carrier testing could be informative in the  period, for the planning of future pregnancies and the etiology of her muliple losses.\par ->Referral for  genetic counseling made.\par 4. Exposure to 1yo family member: I advised Ms Contreras not to share implements or eat food off of the 1yo's plate, and to wash hands after changing diapers. We note that pt is CMV IgG pos, IgM negative. \par \par RT 4w for f/u anatomic survey and f/u MFM Consult. \par f/u with Dr Reinoso regarding lab results and possible ASA prophylaxis. \par

## 2022-12-28 ENCOUNTER — NON-APPOINTMENT (OUTPATIENT)
Age: 37
End: 2022-12-28

## 2022-12-28 ENCOUNTER — ASOB RESULT (OUTPATIENT)
Age: 37
End: 2022-12-28

## 2022-12-28 ENCOUNTER — APPOINTMENT (OUTPATIENT)
Dept: ANTEPARTUM | Facility: CLINIC | Age: 37
End: 2022-12-28

## 2022-12-28 VITALS
HEIGHT: 60 IN | WEIGHT: 151 LBS | DIASTOLIC BLOOD PRESSURE: 69 MMHG | HEART RATE: 112 BPM | BODY MASS INDEX: 29.64 KG/M2 | SYSTOLIC BLOOD PRESSURE: 115 MMHG

## 2022-12-28 PROCEDURE — 76817 TRANSVAGINAL US OBSTETRIC: CPT

## 2022-12-28 PROCEDURE — 76815 OB US LIMITED FETUS(S): CPT

## 2022-12-31 NOTE — OB SUMMARY
[Date: _____] : Date: [unfilled] [Gestational Age: _____] : Gestational Age: [unfilled] [FreeTextEntry1] : Pt is here for her 20 week OB check-up, pt states she has pelvic pressure.\par \par wt - 148 ht- 5 temp- 97.8 b/p- 91/64 pulse- 85 pro- neg glu-neg \par \par Flu vaccine\par NDC- 40268-289-91\par Lot#- \par Exp- 6/30/23 [de-identified] : sm

## 2023-01-03 RX ORDER — PROGESTERONE 100 MG/1
100 INSERT VAGINAL
Qty: 60 | Refills: 0 | Status: ACTIVE | COMMUNITY
Start: 2022-11-01 | End: 1900-01-01

## 2023-01-04 ENCOUNTER — ASOB RESULT (OUTPATIENT)
Age: 38
End: 2023-01-04

## 2023-01-04 ENCOUNTER — APPOINTMENT (OUTPATIENT)
Dept: ANTEPARTUM | Facility: CLINIC | Age: 38
End: 2023-01-04
Payer: COMMERCIAL

## 2023-01-04 VITALS
SYSTOLIC BLOOD PRESSURE: 116 MMHG | HEIGHT: 60 IN | BODY MASS INDEX: 29.45 KG/M2 | HEART RATE: 103 BPM | WEIGHT: 150 LBS | DIASTOLIC BLOOD PRESSURE: 76 MMHG

## 2023-01-04 PROCEDURE — 76817 TRANSVAGINAL US OBSTETRIC: CPT

## 2023-01-04 PROCEDURE — 76815 OB US LIMITED FETUS(S): CPT

## 2023-01-11 ENCOUNTER — ASOB RESULT (OUTPATIENT)
Age: 38
End: 2023-01-11

## 2023-01-11 ENCOUNTER — APPOINTMENT (OUTPATIENT)
Dept: ANTEPARTUM | Facility: CLINIC | Age: 38
End: 2023-01-11
Payer: COMMERCIAL

## 2023-01-11 VITALS
HEIGHT: 60 IN | HEART RATE: 75 BPM | BODY MASS INDEX: 30.04 KG/M2 | SYSTOLIC BLOOD PRESSURE: 105 MMHG | WEIGHT: 153 LBS | DIASTOLIC BLOOD PRESSURE: 78 MMHG

## 2023-01-11 PROCEDURE — 76817 TRANSVAGINAL US OBSTETRIC: CPT

## 2023-01-11 PROCEDURE — 76815 OB US LIMITED FETUS(S): CPT

## 2023-01-13 ENCOUNTER — APPOINTMENT (OUTPATIENT)
Dept: OBGYN | Facility: CLINIC | Age: 38
End: 2023-01-13
Payer: MEDICAID

## 2023-01-13 VITALS
BODY MASS INDEX: 29.45 KG/M2 | SYSTOLIC BLOOD PRESSURE: 101 MMHG | HEART RATE: 93 BPM | WEIGHT: 150 LBS | TEMPERATURE: 98.6 F | DIASTOLIC BLOOD PRESSURE: 63 MMHG | HEIGHT: 60 IN

## 2023-01-13 LAB
BILIRUB UR QL STRIP: NORMAL
CLARITY UR: CLEAR
COLLECTION METHOD: NORMAL
GLUCOSE UR-MCNC: NORMAL
HCG UR QL: 0.2 EU/DL
HGB UR QL STRIP.AUTO: NORMAL
KETONES UR-MCNC: NORMAL
LEUKOCYTE ESTERASE UR QL STRIP: ABNORMAL
NITRITE UR QL STRIP: NORMAL
PH UR STRIP: 8.5
PROT UR STRIP-MCNC: NORMAL
SP GR UR STRIP: 1.01

## 2023-01-13 PROCEDURE — 81003 URINALYSIS AUTO W/O SCOPE: CPT | Mod: QW

## 2023-01-13 PROCEDURE — 99213 OFFICE O/P EST LOW 20 MIN: CPT | Mod: TH

## 2023-01-13 RX ORDER — DOCUSATE SODIUM 100 MG/1
100 CAPSULE ORAL 3 TIMES DAILY
Qty: 90 | Refills: 2 | Status: ACTIVE | COMMUNITY
Start: 2023-01-13 | End: 1900-01-01

## 2023-01-13 RX ORDER — PANTOPRAZOLE 40 MG/1
40 TABLET, DELAYED RELEASE ORAL DAILY
Qty: 30 | Refills: 5 | Status: ACTIVE | COMMUNITY
Start: 2023-01-13 | End: 1900-01-01

## 2023-01-13 NOTE — OB SUMMARY
[Date: _____] : Date: [unfilled] [Gestational Age: _____] : Gestational Age: [unfilled] [FreeTextEntry1] : Pt is here for her 24 week OB check-up and Glucose test, pt has no complaints today.\par \par wt- 150 ht- 5 '0 b/p- 101/63 pulse- 93 temp- 98 pro- neg glu- neg  [de-identified] : sm

## 2023-01-16 LAB
BASOPHILS # BLD AUTO: 0.02 K/UL
BASOPHILS NFR BLD AUTO: 0.2 %
EOSINOPHIL # BLD AUTO: 0.03 K/UL
EOSINOPHIL NFR BLD AUTO: 0.3 %
GLUCOSE 1H P 50 G GLC PO SERPL-MCNC: 99 MG/DL
HCT VFR BLD CALC: 32.3 %
HGB BLD-MCNC: 10.3 G/DL
IMM GRANULOCYTES NFR BLD AUTO: 0.6 %
LYMPHOCYTES # BLD AUTO: 1.82 K/UL
LYMPHOCYTES NFR BLD AUTO: 20.6 %
MAN DIFF?: NORMAL
MCHC RBC-ENTMCNC: 29.3 PG
MCHC RBC-ENTMCNC: 31.9 G/DL
MCV RBC AUTO: 91.8 FL
MONOCYTES # BLD AUTO: 0.43 K/UL
MONOCYTES NFR BLD AUTO: 4.9 %
NEUTROPHILS # BLD AUTO: 6.48 K/UL
NEUTROPHILS NFR BLD AUTO: 73.4 %
PLATELET # BLD AUTO: 268 K/UL
RBC # BLD: 3.52 M/UL
RBC # FLD: 12.9 %
WBC # FLD AUTO: 8.83 K/UL

## 2023-01-19 RX ORDER — DOCUSATE SODIUM 100 MG/1
100 CAPSULE ORAL TWICE DAILY
Qty: 60 | Refills: 1 | Status: ACTIVE | COMMUNITY
Start: 2023-01-19 | End: 1900-01-01

## 2023-01-19 RX ORDER — CHLORHEXIDINE GLUCONATE 4 %
325 (65 FE) LIQUID (ML) TOPICAL 3 TIMES DAILY
Qty: 90 | Refills: 1 | Status: ACTIVE | COMMUNITY
Start: 2022-09-22 | End: 1900-01-01

## 2023-01-19 RX ORDER — MULTIVIT-MIN/IRON/FOLIC ACID/K 18-600-40
500 CAPSULE ORAL
Qty: 60 | Refills: 1 | Status: ACTIVE | COMMUNITY
Start: 2023-01-19 | End: 1900-01-01

## 2023-01-20 ENCOUNTER — APPOINTMENT (OUTPATIENT)
Dept: ANTEPARTUM | Facility: CLINIC | Age: 38
End: 2023-01-20
Payer: COMMERCIAL

## 2023-01-20 ENCOUNTER — ASOB RESULT (OUTPATIENT)
Age: 38
End: 2023-01-20

## 2023-01-20 VITALS
WEIGHT: 156 LBS | SYSTOLIC BLOOD PRESSURE: 105 MMHG | HEART RATE: 96 BPM | BODY MASS INDEX: 30.63 KG/M2 | DIASTOLIC BLOOD PRESSURE: 65 MMHG | HEIGHT: 60 IN

## 2023-01-20 PROCEDURE — 76817 TRANSVAGINAL US OBSTETRIC: CPT

## 2023-01-20 PROCEDURE — 76816 OB US FOLLOW-UP PER FETUS: CPT

## 2023-01-22 PROBLEM — O20.9 BLEEDING IN EARLY PREGNANCY: Status: RESOLVED | Noted: 2022-09-06 | Resolved: 2023-01-22

## 2023-01-22 PROBLEM — Z31.5 ENCOUNTER FOR PROCREATIVE GENETIC COUNSELING AND TESTING: Status: RESOLVED | Noted: 2022-10-23 | Resolved: 2023-01-22

## 2023-01-23 LAB
ANA SER IF-ACNC: NEGATIVE
B-HEM STREP SPEC QL CULT: NORMAL
B2 GLYCOPROT1 IGA SERPL IA-ACNC: <5 SAU
B2 GLYCOPROT1 IGG SER-ACNC: 6.6 SGU
B2 GLYCOPROT1 IGM SER-ACNC: <5 SMU
CARDIOLIPIN AB SER IA-ACNC: NEGATIVE
CARDIOLIPIN IGM SER-MCNC: 9.7 MPL
CARDIOLIPIN IGM SER-MCNC: <5 GPL
CONFIRM: NORMAL
DRVVT IMM 1:2 NP PPP: NORMAL
DRVVT SCREEN TO CONFIRM RATIO: 1 RATIO
SCREEN DRVVT: NORMAL

## 2023-01-27 ENCOUNTER — APPOINTMENT (OUTPATIENT)
Dept: ANTEPARTUM | Facility: CLINIC | Age: 38
End: 2023-01-27
Payer: COMMERCIAL

## 2023-01-27 ENCOUNTER — ASOB RESULT (OUTPATIENT)
Age: 38
End: 2023-01-27

## 2023-01-27 VITALS
HEIGHT: 60 IN | HEART RATE: 89 BPM | SYSTOLIC BLOOD PRESSURE: 119 MMHG | WEIGHT: 156 LBS | BODY MASS INDEX: 30.63 KG/M2 | DIASTOLIC BLOOD PRESSURE: 72 MMHG

## 2023-01-27 PROCEDURE — 76815 OB US LIMITED FETUS(S): CPT

## 2023-01-27 PROCEDURE — 76817 TRANSVAGINAL US OBSTETRIC: CPT

## 2023-01-31 ENCOUNTER — OUTPATIENT (OUTPATIENT)
Dept: OUTPATIENT SERVICES | Facility: HOSPITAL | Age: 38
LOS: 1 days | Discharge: HOME | End: 2023-01-31
Payer: COMMERCIAL

## 2023-01-31 VITALS — HEART RATE: 85 BPM | SYSTOLIC BLOOD PRESSURE: 114 MMHG | DIASTOLIC BLOOD PRESSURE: 61 MMHG

## 2023-01-31 VITALS — HEART RATE: 89 BPM | OXYGEN SATURATION: 100 %

## 2023-01-31 LAB
APPEARANCE UR: ABNORMAL
BACTERIA # UR AUTO: ABNORMAL
BASOPHILS # BLD AUTO: 0.02 K/UL — SIGNIFICANT CHANGE UP (ref 0–0.2)
BASOPHILS NFR BLD AUTO: 0.2 % — SIGNIFICANT CHANGE UP (ref 0–1)
BILIRUB UR-MCNC: NEGATIVE — SIGNIFICANT CHANGE UP
COLOR SPEC: SIGNIFICANT CHANGE UP
COMMENT - URINE: SIGNIFICANT CHANGE UP
D DIMER BLD IA.RAPID-MCNC: 248 NG/ML DDU — HIGH
DIFF PNL FLD: NEGATIVE — SIGNIFICANT CHANGE UP
EOSINOPHIL # BLD AUTO: 0.03 K/UL — SIGNIFICANT CHANGE UP (ref 0–0.7)
EOSINOPHIL NFR BLD AUTO: 0.3 % — SIGNIFICANT CHANGE UP (ref 0–8)
EPI CELLS # UR: >27 /HPF — HIGH (ref 0–5)
GLUCOSE UR QL: NEGATIVE — SIGNIFICANT CHANGE UP
HCT VFR BLD CALC: 38.3 % — SIGNIFICANT CHANGE UP (ref 37–47)
HGB BLD-MCNC: 12.3 G/DL — SIGNIFICANT CHANGE UP (ref 12–16)
HYALINE CASTS # UR AUTO: 4 /LPF — SIGNIFICANT CHANGE UP (ref 0–7)
IMM GRANULOCYTES NFR BLD AUTO: 0.7 % — HIGH (ref 0.1–0.3)
KETONES UR-MCNC: NEGATIVE — SIGNIFICANT CHANGE UP
LEUKOCYTE ESTERASE UR-ACNC: ABNORMAL
LYMPHOCYTES # BLD AUTO: 2.27 K/UL — SIGNIFICANT CHANGE UP (ref 1.2–3.4)
LYMPHOCYTES # BLD AUTO: 24.7 % — SIGNIFICANT CHANGE UP (ref 20.5–51.1)
MCHC RBC-ENTMCNC: 28.7 PG — SIGNIFICANT CHANGE UP (ref 27–31)
MCHC RBC-ENTMCNC: 32.1 G/DL — SIGNIFICANT CHANGE UP (ref 32–37)
MCV RBC AUTO: 89.5 FL — SIGNIFICANT CHANGE UP (ref 81–99)
MONOCYTES # BLD AUTO: 0.43 K/UL — SIGNIFICANT CHANGE UP (ref 0.1–0.6)
MONOCYTES NFR BLD AUTO: 4.7 % — SIGNIFICANT CHANGE UP (ref 1.7–9.3)
NEUTROPHILS # BLD AUTO: 6.39 K/UL — SIGNIFICANT CHANGE UP (ref 1.4–6.5)
NEUTROPHILS NFR BLD AUTO: 69.4 % — SIGNIFICANT CHANGE UP (ref 42.2–75.2)
NITRITE UR-MCNC: NEGATIVE — SIGNIFICANT CHANGE UP
NRBC # BLD: 0 /100 WBCS — SIGNIFICANT CHANGE UP (ref 0–0)
PH UR: 7 — SIGNIFICANT CHANGE UP (ref 5–8)
PLATELET # BLD AUTO: 252 K/UL — SIGNIFICANT CHANGE UP (ref 130–400)
PROT UR-MCNC: SIGNIFICANT CHANGE UP
RBC # BLD: 4.28 M/UL — SIGNIFICANT CHANGE UP (ref 4.2–5.4)
RBC # FLD: 13 % — SIGNIFICANT CHANGE UP (ref 11.5–14.5)
RBC CASTS # UR COMP ASSIST: 3 /HPF — SIGNIFICANT CHANGE UP (ref 0–4)
SARS-COV-2 RNA SPEC QL NAA+PROBE: SIGNIFICANT CHANGE UP
SP GR SPEC: 1.01 — SIGNIFICANT CHANGE UP (ref 1.01–1.03)
UROBILINOGEN FLD QL: SIGNIFICANT CHANGE UP
WBC # BLD: 9.2 K/UL — SIGNIFICANT CHANGE UP (ref 4.8–10.8)
WBC # FLD AUTO: 9.2 K/UL — SIGNIFICANT CHANGE UP (ref 4.8–10.8)
WBC UR QL: 4 /HPF — SIGNIFICANT CHANGE UP (ref 0–5)

## 2023-01-31 PROCEDURE — 99213 OFFICE O/P EST LOW 20 MIN: CPT | Mod: TH

## 2023-01-31 PROCEDURE — 93010 ELECTROCARDIOGRAM REPORT: CPT

## 2023-01-31 NOTE — OB PROVIDER TRIAGE NOTE - NSICDXFAMILYHX_GEN_ALL_CORE_FT
FAMILY HISTORY:  Mother  Still living? Unknown  FH: breast cancer, Age at diagnosis: Age Unknown  FH: hypertension, Age at diagnosis: Age Unknown  FH: type 2 diabetes, Age at diagnosis: Age Unknown    Grandparent  Still living? Unknown  FH: breast cancer, Age at diagnosis: Age Unknown    Aunt  Still living? Unknown  FH: breast cancer, Age at diagnosis: Age Unknown

## 2023-01-31 NOTE — OB PROVIDER TRIAGE NOTE - TERM DELIVERIES, OB PROFILE
Total Pulses (Will Not Render If 0): 0 Dose Increase/Decrease #1: 0% Consent: Written consent obtained, risks reviewed including but not limited to crusting, scabbing, blistering, scarring, darker or lighter pigmentary change, incidental hair removal, bruising, and/or incomplete removal. Total Square Area In Cm2 (Whole Numbers Only Please): 16 Patient Id: [id number] Dose Setting #1 (Mj/Cm2): 142 Total Energy In Joules: 2.27 J Billing: 11823 (Total area less than 250 cm2) Post-Care Instructions: I reviewed with the patient in detail post-care instructions. Patient should stay away from the sun and wear sun protection until treated areas are fully healed. Session Time: 01:34 Comments: 2-3X a week for 6-8 weeks. Treatment Number: 46 Location #1: lip Detail Level: Detailed 0

## 2023-01-31 NOTE — CHART NOTE - NSCHARTNOTEFT_GEN_A_CORE
PGY1      Patient evaluated and monitored on L&D for 2 hours receiving IVF bolus, reports feeling comfortable. Tracing remains reactive, noncontractile, unchanged SVE 0/0/-3.  precautions given, with instructions to see Dr. Reinoso on Friday, and to pick-up vaginal progesterone from pharmacy.      Dr. Reinoso and Dr. Monte aware. PGY1      Patient evaluated and monitored on L&D for 2 hours receiving IVF bolus, reports feeling comfortable. Tracing remains reactive, noncontractile, unchanged SVE 0/0/-3.  precautions given, with instructions to see Dr. Reinoso on Friday, and to pick-up vaginal progesterone from pharmacy.      Vital Signs Last 24 Hrs  T(C): 36.5 (2023 14:29), Max: 36.5 (2023 14:29)  T(F): 97.7 (2023 14:29), Max: 97.7 (2023 14:29)  HR: 93 (2023 19:40) (73 - 114)  BP: 106/61 (2023 19:16) (106/61 - 128/67)  BP(mean): --  RR: 18 (2023 14:29) (18 - 18)  SpO2: 99% (2023 19:40) (93% - 100%)    EFM: 135/mod variability/+ accels/reactive   [place]: noncontractile  SVE: 0/0/-3 vertex, intact                          12.3   9.20  )-----------( 252      ( 2023 15:00 )             38.3       Urinalysis Basic - ( 2023 18:12 )    Color: Light Yellow / Appearance: Slightly Turbid / S.009 / pH: x  Gluc: x / Ketone: Negative  / Bili: Negative / Urobili: <2 mg/dL   Blood: x / Protein: Trace / Nitrite: Negative   Leuk Esterase: Large / RBC: 3 /HPF / WBC 4 /HPF   Sq Epi: x / Non Sq Epi: >27 /HPF / Bacteria: Few        Dr. Reinoso and Dr. Monte aware.

## 2023-01-31 NOTE — OB PROVIDER TRIAGE NOTE - NSHPPHYSICALEXAM_GEN_ALL_CORE
Vital Signs Last 24 Hrs  T(C): 36.5 (31 Jan 2023 14:29), Max: 36.5 (31 Jan 2023 14:29)  T(F): 97.7 (31 Jan 2023 14:29), Max: 97.7 (31 Jan 2023 14:29)  HR: 79 (31 Jan 2023 16:28) (76 - 114)  BP: 120/73 (31 Jan 2023 14:29) (120/73 - 120/73)  RR: 18 (31 Jan 2023 14:29) (18 - 18)  SpO2: 97% (31 Jan 2023 16:32) (93% - 100%)    Gen: NAD, sitting comfortably  Cardio: RRR, no m/r/g  Resp: CTAB, no w/r/r  Abd: Gravid, soft, NT, palpable ctx  SVE: deferred  Speculum: Cervix appears closed, no discharge or bleeding  EFM: 130/mod/+accels, loss of contact secondary to fetal and maternal movement  [place]: irritability  Sono: tranvserse, head maternal left, back up, post placenta, MVP 6cm, BPP 8/8, CL 1.70-2.4cm, funnel measuring approx 1cm.

## 2023-01-31 NOTE — PROGRESS NOTE ADULT - PROVIDER SPECIALTY LIST ADULT
OB Bill Insurance (You Assume Risk Of Denial Or Audit By Selecting Yes): Yes Medical Necessity Clause: This procedure was medically was performed at the request of the patient. The lesions were not inflamed but were bothersome to him. Include Z78.9 (Other Specified Conditions Influencing Health Status) As An Associated Diagnosis?: No Consent: The patient's verbal consent was obtained including but not limited to risks of crusting, scabbing, blistering, scarring, darker or lighter pigmentary change, recurrence, incomplete removal and infection. Duration Of Freeze Thaw-Cycle (Seconds): 10 Post-Care Instructions: I reviewed with the patient in detail post-care instructions. Patient is to wear sunprotection, and avoid picking at any of the treated lesions. Pt may apply Vaseline to crusted or scabbing areas. Number Of Freeze-Thaw Cycles: 1 freeze-thaw cycle Medical Necessity Information: It is in your best interest to select a reason for this procedure from the list below. All of these items fulfill various CMS LCD requirements except the new and changing color options. Detail Level: Detailed

## 2023-01-31 NOTE — OB PROVIDER TRIAGE NOTE - NSOBPROVIDERNOTE_OBGYN_ALL_OB_FT
36yo  @27w1d, Rh neg s/p rhogam in 1st trimester, with pelvic pressure and back pain, r/o labor, with shortness of breath r/o viral illness vs. pregnancy-associated SOB, BPP 10/10.    - f/u EKG  - f/u CBC, d-dimer, UA, ucx, covid swab  - cont efm/toco  - will reevaluate after lab results    Dr. Reinoso aware. 36yo  @27w1d, Rh neg s/p rhogam in 1st trimester, with pelvic pressure and back pain, r/o labor, with shortness of breath r/o viral illness vs. pregnancy-associated SOB, BPP 10/10.    - f/u EKG  - f/u CBC, d-dimer, UA, ucx, covid swab  - cont efm/toco  - will reevaluate after lab results    Dr. Reinoso aware.    ADDENDUM: Patient reevaluated, clinically feeling more comfortable, reactive tracing, noncontractile, unchanged SVE 0/0/-3.  PTL precautions discussed with instructions to follow with Dr. Reinoso on Friday and  vaginal progesterone from pharmacy.      Dr. Reinoso and Dr. Santiago aware.

## 2023-01-31 NOTE — PROGRESS NOTE ADULT - SUBJECTIVE AND OBJECTIVE BOX
PGY4 Note    Patient continues to report non-specific symptoms such as intermittent shortness of breath with movement, pelvic pressure and back pain.  Chest tightness is currently resolved.  No other OB complaints.    Vital Signs Last 24 Hrs  T(C): 36.5 (31 Jan 2023 14:29), Max: 36.5 (31 Jan 2023 14:29)  T(F): 97.7 (31 Jan 2023 14:29), Max: 97.7 (31 Jan 2023 14:29)  HR: 80 (31 Jan 2023 16:57) (73 - 114)  BP: 120/73 (31 Jan 2023 14:29) (120/73 - 120/73)  BP(mean): --  RR: 18 (31 Jan 2023 14:29) (18 - 18)  SpO2: 99% (31 Jan 2023 16:57) (93% - 100%)    Physical Exam:   EFM: 130/mod, loss of contact secondary to fetal and maternal movement  [place]: irritability   SVE: 0/0/-3      Labs:                        12.3   9.20  )-----------( 252      ( 31 Jan 2023 15:00 )             38.3     D-Dimer Assay, Quantitative: 248: Manufacturers recommended Cut off for VTE is 230 ng/ml D-DU  Note: New Reference Range in effect ng/mL DDU (01.31.23 @ 15:00)      EKG: NSR  COVID neg     PGY4 Note    Patient continues to report non-specific symptoms such as intermittent shortness of breath with movement, pelvic pressure and back pain.  Chest tightness is currently resolved.  No other OB complaints.    Vital Signs Last 24 Hrs  T(C): 36.5 (2023 14:29), Max: 36.5 (2023 14:29)  T(F): 97.7 (2023 14:29), Max: 97.7 (2023 14:29)  HR: 80 (2023 16:57) (73 - 114)  BP: 120/73 (2023 14:29) (120/73 - 120/73)  BP(mean): --  RR: 18 (2023 14:29) (18 - 18)  SpO2: 99% (2023 16:57) (93% - 100%)    Physical Exam:   EFM: 130/mod, loss of contact secondary to fetal and maternal movement  [place]: irritability   SVE: 0/0/-3      Labs:                        12.3   9.20  )-----------( 252      ( 2023 15:00 )             38.3     D-Dimer Assay, Quantitative: 248: Manufacturers recommended Cut off for VTE is 230 ng/ml D-DU  Note: New Reference Range in effect ng/mL DDU (23 @ 15:00)      EKG: NSR  COVID neg    Urinalysis Basic - ( 2023 18:12 )    Color: Light Yellow / Appearance: Slightly Turbid / S.009 / pH: x  Gluc: x / Ketone: Negative  / Bili: Negative / Urobili: <2 mg/dL   Blood: x / Protein: Trace / Nitrite: Negative   Leuk Esterase: Large / RBC: 3 /HPF / WBC 4 /HPF   Sq Epi: x / Non Sq Epi: >27 /HPF / Bacteria: Few

## 2023-01-31 NOTE — OB RN TRIAGE NOTE - NSLDARRIVAL_OBGYN_ALL_OB_START_DATE
Thoracic Surgery Progress Note    Subjective:  No acute issues overnight. Pain controlled. Tolerating diet.    Vitals:  /63 (BP Location: Right arm)  Pulse 84  Temp 99.3  F (37.4  C) (Axillary)  Resp 18  Wt 87 kg (191 lb 12.8 oz)  SpO2 93%  BMI 32.41 kg/m2  Gen: Awake, alert, NAD , interactive  Resp: non-labored  CT: Good seal. No airleak noted  Abd: Soft, non-distended, NTTP  Ext: warm and well perfused, no edema      A/P: 60 year old female with metastatic breast cancer and chronic large emphysematous bleb who presents with right sided pneumothorax now s/p chest tube. Now POD #4 s/p airway valve placement with pulmonology.       -AM CXR is unchanged. OK to discharge from Thoracic surgery standpoint.   -No CPAP or BiPAP for 1 week from today, 7/24/17 so that the lung can heal  -Thoracic surgery will sign off. Please call with questions. Patient does not need specific thoracic surgery follow up     Seen and discussed with thoracic fellow. To be discussed with staff.    Souleymane Soria MD  General Surgery PGY-3     31-Jan-2023 15:20

## 2023-01-31 NOTE — OB PROVIDER TRIAGE NOTE - NS_OBGYNHISTORY_OBGYN_ALL_OB_FT
OB: SAB x4, no D&C  received medical treatment for 1 SAB  Ectopic x1 s/p MTX      GYN: h/o HSV positive serology.  H/o large subserosal fibroid.  Denies cysts or abnormal pap smears.

## 2023-01-31 NOTE — OB PROVIDER TRIAGE NOTE - HISTORY OF PRESENT ILLNESS
36yo  @27w1d by LMP c/w with first trimester sonogram presenting for multiple complaints.  Patient reports back pain, cramping and pelvic pressure since her first trimester.  Pain is relieved mildly with PO tylenol and rest.  Denies vaginal discharge or bleeding. Patient is Rh neg and received rhogam at 8wks for threatened AB.  Denies contraction pain.  Good fetal movement.  Pregnancy complicated by dynamic cervical changes with mild funneling, last CL on  was 2cm.  She has been on vaginal progesterone since 16wk GA.  Patient is consanguinous with partner, NIPT and AFP low risk.  Also with history of recurrent pregnancy loss, APLS workup negative during this pregnancy. Patient previously underwent IUI and IVF for pregnancy however this pregnancy was spontaneous.    Also reports shortness of breath, chest tightness and intermittent palpitations since the beginning of her pregnancy, however worsening since yesterday and more constant. Reports shortness of breath is relieved with supine position and rest.  These symptoms are also relieved after drinking gatorade and orange juice. Denies sick contacts.  Denies HA, N/V, fevers, chills, diarrhea, dysuria, vaginal discharge.  Last intercourse 7 months ago, last PO intake @1200 today.  Last BM this AM.

## 2023-01-31 NOTE — OB RN TRIAGE NOTE - FALL HARM RISK - UNIVERSAL INTERVENTIONS
Bed in lowest position, wheels locked, appropriate side rails in place/Call bell, personal items and telephone in reach/Instruct patient to call for assistance before getting out of bed or chair/Non-slip footwear when patient is out of bed/[place] to call system/Physically safe environment - no spills, clutter or unnecessary equipment/Purposeful Proactive Rounding/Room/bathroom lighting operational, light cord in reach

## 2023-01-31 NOTE — OB PROVIDER TRIAGE NOTE - NSHPLABSRESULTS_GEN_ALL_CORE
Labs:  9/20/22  HBsAg NR  HCV NR  Rubella immune  HSV 1 IgG pos  HSV 2 neg  measles immune  varicella immune  RPR NR  Mumps immune  B neg, antibody neg  ucx neg  HIV NR    11/21  GBS neg    GCT 99  AFP low risk  NIPT low risk    Sono:  9/2: 5w4d, 7cm subserosal myoma  11/1: 14w1d, posterior previa, breech, SIUP, dynamic cervix 2.4-3.6mm  11/21: 17w0d, EFW 199g, CL 4.1cm, normal anatomy, 13cm anterior LAURENT fibroid  12/22: 21w3d, EFW 430g, normal anatomy, 12cm fibroid  1/20: 25w4d, EFW 891g (62%), vtx, post placenta, no previa, CL 3.35cm  1/27: 26w4d, CL 2cm, fibroid 13.9cm

## 2023-01-31 NOTE — OB PROVIDER TRIAGE NOTE - NS_SONONOTE_OBGYN_ALL_OB_FT
tranvserse, head maternal left, back up, post placenta, MVP 6cm, BPP 8/8, CL 1.70-2.4cm, funnel measuring approx 1cm.

## 2023-01-31 NOTE — PROGRESS NOTE ADULT - ASSESSMENT
A/P: 36yo  @27w1d, Rh neg s/p rhogam in 1st trimester, with pelvic pressure and back pain, r/o  labor vs. UTI, wwith shortness of breath likely normal pregnancy-associated, maternal and fetal wellbeing reassuring.    - cont efm/toco  - IVF bolus  - patient is currently closed, will re-examine after fluid bolus  - f/u UA, ucx    Dr. Reinoso aware. A/P: 38yo  @27w1d, Rh neg s/p rhogam in 1st trimester, with pelvic pressure and back pain, r/o  labor vs. UTI, wwith shortness of breath likely normal pregnancy-associated, maternal and fetal wellbeing reassuring.    - cont efm/toco  - IVF bolus  - patient is currently closed, will re-examine after fluid bolus  - f/u Ucx    Dr. Reinoso aware.    PGY1  ADDENDUM     Patient evaluated and monitored on L&D for 2 hours, reports feeling comfortable. Tracing remains reactive, noncontractile, unchanged SVE 0/0/-3.  precautions given, with instructions to see Dr. Reinoso on Friday, and to pick-up vaginal progesterone from pharmacy.      Dr. Reinoso and Dr. Monte aware.  A/P: 36yo  @27w1d, Rh neg s/p rhogam in 1st trimester, with pelvic pressure and back pain, r/o  labor vs. UTI, wwith shortness of breath likely normal pregnancy-associated, maternal and fetal wellbeing reassuring.    - cont efm/toco  - IVF bolus  - patient is currently closed, will re-examine after fluid bolus  - f/u Ucx    Dr. Reinoso aware.

## 2023-01-31 NOTE — OB PROVIDER TRIAGE NOTE - ADDITIONAL INSTRUCTIONS
Return to L&D if you experience any of the following: painful regular contractions, leakage of vaginal fluid, vaginal bleeding, or decreased fetal movement (less than 10 movements in 2 hours).

## 2023-02-01 DIAGNOSIS — M54.89 OTHER DORSALGIA: ICD-10-CM

## 2023-02-01 DIAGNOSIS — O26.892 OTHER SPECIFIED PREGNANCY RELATED CONDITIONS, SECOND TRIMESTER: ICD-10-CM

## 2023-02-01 DIAGNOSIS — R10.0 ACUTE ABDOMEN: ICD-10-CM

## 2023-02-01 LAB
CULTURE RESULTS: SIGNIFICANT CHANGE UP
SPECIMEN SOURCE: SIGNIFICANT CHANGE UP

## 2023-02-03 ENCOUNTER — APPOINTMENT (OUTPATIENT)
Dept: ANTEPARTUM | Facility: CLINIC | Age: 38
End: 2023-02-03
Payer: COMMERCIAL

## 2023-02-03 ENCOUNTER — ASOB RESULT (OUTPATIENT)
Age: 38
End: 2023-02-03

## 2023-02-03 VITALS
DIASTOLIC BLOOD PRESSURE: 73 MMHG | WEIGHT: 158 LBS | SYSTOLIC BLOOD PRESSURE: 120 MMHG | HEIGHT: 60 IN | HEART RATE: 96 BPM | BODY MASS INDEX: 31.02 KG/M2

## 2023-02-03 PROBLEM — D25.9 LEIOMYOMA OF UTERUS, UNSPECIFIED: Chronic | Status: ACTIVE | Noted: 2023-01-31

## 2023-02-03 PROCEDURE — 76817 TRANSVAGINAL US OBSTETRIC: CPT

## 2023-02-03 PROCEDURE — 76815 OB US LIMITED FETUS(S): CPT

## 2023-02-07 ENCOUNTER — APPOINTMENT (OUTPATIENT)
Dept: OBGYN | Facility: CLINIC | Age: 38
End: 2023-02-07
Payer: COMMERCIAL

## 2023-02-07 VITALS
HEIGHT: 60 IN | DIASTOLIC BLOOD PRESSURE: 60 MMHG | SYSTOLIC BLOOD PRESSURE: 92 MMHG | HEART RATE: 87 BPM | TEMPERATURE: 98.6 F | BODY MASS INDEX: 30.63 KG/M2 | WEIGHT: 156 LBS

## 2023-02-07 DIAGNOSIS — N96 RECURRENT PREGNANCY LOSS: ICD-10-CM

## 2023-02-07 LAB
ABO + RH PNL BLD: NORMAL
BILIRUB UR QL STRIP: NORMAL
BLD GP AB SCN SERPL QL: NORMAL
CLARITY UR: CLEAR
COLLECTION METHOD: NORMAL
GLUCOSE UR-MCNC: NORMAL
HCG UR QL: 0.2 EU/DL
HGB UR QL STRIP.AUTO: ABNORMAL
KETONES UR-MCNC: NORMAL
LEUKOCYTE ESTERASE UR QL STRIP: ABNORMAL
NITRITE UR QL STRIP: NORMAL
PH UR STRIP: 7
PROT UR STRIP-MCNC: NORMAL
SP GR UR STRIP: 1.01

## 2023-02-07 PROCEDURE — 96372 THER/PROPH/DIAG INJ SC/IM: CPT

## 2023-02-07 PROCEDURE — 99213 OFFICE O/P EST LOW 20 MIN: CPT | Mod: TH,25

## 2023-02-07 PROCEDURE — 90715 TDAP VACCINE 7 YRS/> IM: CPT

## 2023-02-07 PROCEDURE — 90471 IMMUNIZATION ADMIN: CPT

## 2023-02-07 PROCEDURE — 81003 URINALYSIS AUTO W/O SCOPE: CPT | Mod: QW

## 2023-02-07 NOTE — OB SUMMARY
[Date: _____] : Date: [unfilled] [Gestational Age: _____] : Gestational Age: [unfilled] [FreeTextEntry1] : Pt is here for her 28 week OB check-up RhoGAM and Tdap injection, pt states she has same cramping/pressure as always.\par \par wt- 156 ht- 5 temp- 98.6 b/p- 92/60 pulse- 87 pro- neg glu- neg \par \par Tdap injection\par [id number]\par Lot#- [id number]\par Exp- 12/162024\par \par RhoGAM\par [id number]- [id number]\par Lot#- [id number]\par Exp- 2/25/24\par  [de-identified] : sm

## 2023-02-24 ENCOUNTER — APPOINTMENT (OUTPATIENT)
Dept: OBGYN | Facility: CLINIC | Age: 38
End: 2023-02-24
Payer: COMMERCIAL

## 2023-02-24 VITALS
DIASTOLIC BLOOD PRESSURE: 57 MMHG | HEIGHT: 60 IN | TEMPERATURE: 98.6 F | SYSTOLIC BLOOD PRESSURE: 80 MMHG | BODY MASS INDEX: 31.02 KG/M2 | HEART RATE: 110 BPM | WEIGHT: 158 LBS

## 2023-02-24 DIAGNOSIS — O99.619 DISEASES OF THE DIGESTIVE SYSTEM COMPLICATING PREGNANCY, UNSPECIFIED TRIMESTER: ICD-10-CM

## 2023-02-24 DIAGNOSIS — K59.00 DISEASES OF THE DIGESTIVE SYSTEM COMPLICATING PREGNANCY, UNSPECIFIED TRIMESTER: ICD-10-CM

## 2023-02-24 PROCEDURE — 99213 OFFICE O/P EST LOW 20 MIN: CPT | Mod: TH

## 2023-02-25 RX ORDER — FOLIC ACID 1 MG/1
1 TABLET ORAL DAILY
Qty: 30 | Refills: 1 | Status: ACTIVE | COMMUNITY
Start: 2023-02-25 | End: 1900-01-01

## 2023-03-10 ENCOUNTER — APPOINTMENT (OUTPATIENT)
Dept: ANTEPARTUM | Facility: CLINIC | Age: 38
End: 2023-03-10
Payer: COMMERCIAL

## 2023-03-10 ENCOUNTER — ASOB RESULT (OUTPATIENT)
Age: 38
End: 2023-03-10

## 2023-03-10 ENCOUNTER — APPOINTMENT (OUTPATIENT)
Dept: ANTEPARTUM | Facility: CLINIC | Age: 38
End: 2023-03-10
Payer: MEDICAID

## 2023-03-10 VITALS
HEART RATE: 76 BPM | BODY MASS INDEX: 33.57 KG/M2 | SYSTOLIC BLOOD PRESSURE: 100 MMHG | HEIGHT: 60 IN | WEIGHT: 171 LBS | DIASTOLIC BLOOD PRESSURE: 67 MMHG

## 2023-03-10 PROCEDURE — 76816 OB US FOLLOW-UP PER FETUS: CPT

## 2023-03-10 PROCEDURE — ZZZZZ: CPT

## 2023-03-10 PROCEDURE — 76818 FETAL BIOPHYS PROFILE W/NST: CPT | Mod: 59

## 2023-03-14 ENCOUNTER — APPOINTMENT (OUTPATIENT)
Dept: OBGYN | Facility: CLINIC | Age: 38
End: 2023-03-14
Payer: COMMERCIAL

## 2023-03-14 VITALS
BODY MASS INDEX: 31.8 KG/M2 | DIASTOLIC BLOOD PRESSURE: 80 MMHG | SYSTOLIC BLOOD PRESSURE: 95 MMHG | HEIGHT: 60 IN | HEART RATE: 84 BPM | TEMPERATURE: 98.7 F | WEIGHT: 162 LBS

## 2023-03-14 PROCEDURE — 99213 OFFICE O/P EST LOW 20 MIN: CPT | Mod: TH

## 2023-03-14 PROCEDURE — 81003 URINALYSIS AUTO W/O SCOPE: CPT | Mod: QW

## 2023-03-15 ENCOUNTER — OUTPATIENT (OUTPATIENT)
Dept: INPATIENT UNIT | Facility: HOSPITAL | Age: 38
LOS: 1 days | Discharge: HOME | End: 2023-03-15
Payer: COMMERCIAL

## 2023-03-15 VITALS
HEART RATE: 88 BPM | RESPIRATION RATE: 20 BRPM | DIASTOLIC BLOOD PRESSURE: 78 MMHG | TEMPERATURE: 99 F | SYSTOLIC BLOOD PRESSURE: 129 MMHG

## 2023-03-15 DIAGNOSIS — O26.853 SPOTTING COMPLICATING PREGNANCY, THIRD TRIMESTER: ICD-10-CM

## 2023-03-15 DIAGNOSIS — O26.893 OTHER SPECIFIED PREGNANCY RELATED CONDITIONS, THIRD TRIMESTER: ICD-10-CM

## 2023-03-15 LAB
BASOPHILS # BLD AUTO: 0.02 K/UL — SIGNIFICANT CHANGE UP (ref 0–0.2)
BASOPHILS NFR BLD AUTO: 0.2 % — SIGNIFICANT CHANGE UP (ref 0–1)
EOSINOPHIL # BLD AUTO: 0.04 K/UL — SIGNIFICANT CHANGE UP (ref 0–0.7)
EOSINOPHIL NFR BLD AUTO: 0.5 % — SIGNIFICANT CHANGE UP (ref 0–8)
HCT VFR BLD CALC: 32.3 % — LOW (ref 37–47)
HGB BLD-MCNC: 10.3 G/DL — LOW (ref 12–16)
HIV 1 & 2 AB SERPL IA.RAPID: SIGNIFICANT CHANGE UP
IMM GRANULOCYTES NFR BLD AUTO: 0.6 % — HIGH (ref 0.1–0.3)
LYMPHOCYTES # BLD AUTO: 2.4 K/UL — SIGNIFICANT CHANGE UP (ref 1.2–3.4)
LYMPHOCYTES # BLD AUTO: 28.3 % — SIGNIFICANT CHANGE UP (ref 20.5–51.1)
MCHC RBC-ENTMCNC: 27.9 PG — SIGNIFICANT CHANGE UP (ref 27–31)
MCHC RBC-ENTMCNC: 31.9 G/DL — LOW (ref 32–37)
MCV RBC AUTO: 87.5 FL — SIGNIFICANT CHANGE UP (ref 81–99)
MONOCYTES # BLD AUTO: 0.48 K/UL — SIGNIFICANT CHANGE UP (ref 0.1–0.6)
MONOCYTES NFR BLD AUTO: 5.7 % — SIGNIFICANT CHANGE UP (ref 1.7–9.3)
NEUTROPHILS # BLD AUTO: 5.5 K/UL — SIGNIFICANT CHANGE UP (ref 1.4–6.5)
NEUTROPHILS NFR BLD AUTO: 64.7 % — SIGNIFICANT CHANGE UP (ref 42.2–75.2)
NRBC # BLD: 0 /100 WBCS — SIGNIFICANT CHANGE UP (ref 0–0)
PLATELET # BLD AUTO: 254 K/UL — SIGNIFICANT CHANGE UP (ref 130–400)
PRENATAL SYPHILIS TEST: SIGNIFICANT CHANGE UP
RBC # BLD: 3.69 M/UL — LOW (ref 4.2–5.4)
RBC # FLD: 14 % — SIGNIFICANT CHANGE UP (ref 11.5–14.5)
WBC # BLD: 8.49 K/UL — SIGNIFICANT CHANGE UP (ref 4.8–10.8)
WBC # FLD AUTO: 8.49 K/UL — SIGNIFICANT CHANGE UP (ref 4.8–10.8)

## 2023-03-15 PROCEDURE — 87801 DETECT AGNT MULT DNA AMPLI: CPT

## 2023-03-15 PROCEDURE — 87591 N.GONORRHOEAE DNA AMP PROB: CPT

## 2023-03-15 PROCEDURE — 86703 HIV-1/HIV-2 1 RESULT ANTBDY: CPT

## 2023-03-15 PROCEDURE — 59025 FETAL NON-STRESS TEST: CPT

## 2023-03-15 PROCEDURE — 36415 COLL VENOUS BLD VENIPUNCTURE: CPT

## 2023-03-15 PROCEDURE — 86592 SYPHILIS TEST NON-TREP QUAL: CPT

## 2023-03-15 PROCEDURE — 87798 DETECT AGENT NOS DNA AMP: CPT

## 2023-03-15 PROCEDURE — 87653 STREP B DNA AMP PROBE: CPT

## 2023-03-15 PROCEDURE — 99214 OFFICE O/P EST MOD 30 MIN: CPT

## 2023-03-15 PROCEDURE — 85025 COMPLETE CBC W/AUTO DIFF WBC: CPT

## 2023-03-15 PROCEDURE — 87661 TRICHOMONAS VAGINALIS AMPLIF: CPT

## 2023-03-15 PROCEDURE — 99282 EMERGENCY DEPT VISIT SF MDM: CPT

## 2023-03-15 PROCEDURE — 87491 CHLMYD TRACH DNA AMP PROBE: CPT

## 2023-03-15 RX ORDER — SODIUM CHLORIDE 9 MG/ML
1000 INJECTION, SOLUTION INTRAVENOUS
Refills: 0 | Status: DISCONTINUED | OUTPATIENT
Start: 2023-03-15 | End: 2023-03-15

## 2023-03-15 RX ADMIN — SODIUM CHLORIDE 125 MILLILITER(S): 9 INJECTION, SOLUTION INTRAVENOUS at 20:49

## 2023-03-15 RX ADMIN — Medication 12 MILLIGRAM(S): at 21:23

## 2023-03-15 NOTE — OB PROVIDER TRIAGE NOTE - NSHPLABSRESULTS_GEN_ALL_CORE
2/24/23  vaginitis neg    2/7/23  B neg, neg antibody screen    1/13/23  GCT 99    11/21/22  GBS neg    11/15/22  AFP wnl    10/4/22  NIPT low risk    9/20/22  A1c 5.2  HepBSAg NR  HCV NR  HIV NR  TrepAb neg  rubella immune  measles immune  varicella immune    SONOS  32w4d: vertex, posterior placenta, NST 10/10, EFW 2126gm (58%ile), left lateral subserosal fibroid 12.23x10.47x8.7 cm  27w4d: cervix 1.9cm  25w4d: cervix 3.35cm  24w2d: cervix 4.13cm  21w3d: incomplete anatomy scan with no gross abnormalities  12w1d: NT wnl

## 2023-03-15 NOTE — OB PROVIDER TRIAGE NOTE - NSOBPROVIDERNOTE_OBGYN_ALL_OB_FT
A/P: 36yo  at 33w2d GA, with painless cervical dilation, with reassuring maternal and fetal status    - f/u GBS, vaginitis  - f/u CBC, HIV, RPR  - cont EFM/toco  - betamethasone 12mg IM [id number]  - IVF    Dr. Dawkins and Dr. Reinoso aware A/P: 36yo  at 33w2d GA, with painless cervical dilation, with reassuring maternal and fetal status    - f/u GBS, vaginitis  - f/u CBC, HIV, RPR  - cont EFM/toco  - betamethasone 12mg IM [id number]  - IVF    Dr. Dawkins and Dr. Reinoso aware    ADDENDUM  Patient received celestone dose number #1 at 2123. Patient discharged home in stable condition. She will return in 24 hours at 3/16 at 2100. A/P: 38yo  at 33w2d GA, with painless cervical dilation, with reassuring maternal and fetal status    - f/u GBS, vaginitis  - f/u CBC, HIV, RPR  - cont EFM/toco  - betamethasone 12mg IM [id number]  - IVF    Dr. Dawkins and Dr. Reinoso aware    ADDENDUM  Patient received celestone dose number #1 at . She was rechecked at  and was found to be the same exam at 3/50/-3. A/P: 36yo  at 33w2d GA, with painless cervical dilation, with reassuring maternal and fetal status    - f/u GBS, vaginitis  - f/u CBC, HIV, RPR  - cont EFM/toco  - betamethasone 12mg IM [id number]  - IVF    Dr. Dawkins and Dr. Reinoso aware    ADDENDUM  Patient received celestone dose number #1 at . She was rechecked at  and was found to be the same exam at 3/50/-3. Karen irregularly, denies change in intensity or frequency of contractions. Denies vaginal bleeding. Reports good fetal movement. Patient to come to L&D tomorrow at 2100 for second dose of celestone.  labor precautions, [facility]KC instructions, PO maternal hydration encouraged. Bleeding precautions given.

## 2023-03-15 NOTE — OB PROVIDER TRIAGE NOTE - HISTORY OF PRESENT ILLNESS
36yo  at 33w2d GA (SUKHDEEP 23, by LMP) presents to labor and delivery with dark red vaginal spotting at 1530. It was a single episode and she has not noticed and bleeding since. She denies contractions, leakage of fluids. Endorses good fetal movement. She denies fevers, chills, SOB, chest pain, nausea, vomiting, diarrhea. Denies sick contacts.    Pregnancy complicated by dynamic cervical changes with mild funneling, on vaginal progesterone since 16wk GA.  Patient is consanguineous with partner, NIPT and AFP low risk.  Also with history of recurrent pregnancy loss, APLS workup negative during this pregnancy. Patient previously underwent IUI and IVF for pregnancy however this pregnancy was spontaneous.

## 2023-03-15 NOTE — OB RN TRIAGE NOTE - FALL HARM RISK - UNIVERSAL INTERVENTIONS
Bed in lowest position, wheels locked, appropriate side rails in place/Call bell, personal items and telephone in reach/Instruct patient to call for assistance before getting out of bed or chair/Non-slip footwear when patient is out of bed/[place] to call system/Physically safe environment - no spills, clutter or unnecessary equipment/Purposeful Proactive Rounding/Room/bathroom lighting operational, light cord in reach

## 2023-03-15 NOTE — OB PROVIDER TRIAGE NOTE - ADDITIONAL INSTRUCTIONS
If you have contractions every few minutes, vaginal bleeding, leakage of fluid or you don't feel the baby move please call your doctor or come to labor and delivery.    Please come for your second dose of celestone tomorrow, 3/16, at 9:00 PM. Please return to hospital if you experience decreased fetal movement, heavy vaginal bleeding, frequent and persistent contractions, or leakage of fluid. Please keep your appointment with your doctor.

## 2023-03-15 NOTE — OB PROVIDER TRIAGE NOTE - NSHPPHYSICALEXAM_GEN_ALL_CORE
Vital Signs Last 24 Hrs  T(C): 37.1 (15 Mar 2023 17:41), Max: 37.1 (15 Mar 2023 17:41)  T(F): 98.7 (15 Mar 2023 17:41), Max: 98.7 (15 Mar 2023 17:41)  HR: 88 (15 Mar 2023 17:41) (88 - 88)  BP: 129/78 (15 Mar 2023 17:41) (129/78 - 129/78)  RR: 20 (15 Mar 2023 17:41) (20 - 20)    Physical Exam  Gen: AAOx3, NAD  Abd: soft, gravid, nontender, no palpable contractions  Ext: no edema or erythema in bilateral upper and lower extremities  Spec: no blood in vagina, no lacerations, no pooling  SVE: 3/50/-3    EFM: 125/mod/+accels  [place]: none  BSUS: vertex, posterior placenta, BPP 8/8, MVP 6.99cm, cervix 1.77cm

## 2023-03-15 NOTE — OB PROVIDER TRIAGE NOTE - ATTENDING COMMENTS
I was called about the patients assessment and evaluation and mgmt planning.  She received IV hydration and labs and was monitored closely to r/o PTL.  She was 3/60/-3 as in the office yesterday without change despite seeing irregular CTXs.  She has a large fibroid uterus and has had pain from the fibroid since the beginning of the pregnancy.  She was noted to have short cervix weeks ago and has been using vaginal progesterone.  she has noted to have slow cervical dilation over the last couple weeks without change in her baseline pain status.  Due to premature dilation, irregular CTXs, and 33 wk prematurity and high risks of PTL with fibroids and also AMA, it was discussed with Dr. Padilla [facility] and was recommended to offer the patient steroid administration for possible PTD.  I counseled the patient extensively regarding the R/B and unknown potential harm if baby is delivered at term.  She understood all counseling, all questions answered satisfactorily and patient and  stated they would like to proceed with steroid administration.  Patient received first dose or betamethasone, and IV hydration, she still had irregular CTXs and no change in cervical exam and the tracing was consistent with her outpatient tracings in [facility] clinic.  She was offered to stay 24 hr for observation and to finish steroir course but she was insistent to go home and will return for second dose.  Strict PTL precautions, pain and bleeding precautions, fetal kick counts discussed. FHT was reactive without signs of fetal distress.  - AF

## 2023-03-15 NOTE — OB PROVIDER TRIAGE NOTE - NS_OBGYNHISTORY_OBGYN_ALL_OB_FT
ObHx:   h/o 1 ectopic pregnancy, treated with methotrexate  h/o 4 SAB, no D+C    GynHx: 13cm fibroid on L ObHx:   h/o 1 ectopic pregnancy, treated with methotrexate  h/o 4 SAB, no D+C    GynHx: 13cm fibroid on sonos. Denies h/o ovarian cysts, abnormal PAPs, STIs.

## 2023-03-16 ENCOUNTER — OUTPATIENT (OUTPATIENT)
Dept: INPATIENT UNIT | Facility: HOSPITAL | Age: 38
LOS: 1 days | Discharge: HOME | End: 2023-03-16

## 2023-03-16 VITALS — SYSTOLIC BLOOD PRESSURE: 118 MMHG | DIASTOLIC BLOOD PRESSURE: 67 MMHG | HEART RATE: 108 BPM

## 2023-03-16 VITALS
DIASTOLIC BLOOD PRESSURE: 67 MMHG | SYSTOLIC BLOOD PRESSURE: 118 MMHG | HEART RATE: 108 BPM | TEMPERATURE: 98 F | RESPIRATION RATE: 17 BRPM

## 2023-03-16 DIAGNOSIS — O26.893 OTHER SPECIFIED PREGNANCY RELATED CONDITIONS, THIRD TRIMESTER: ICD-10-CM

## 2023-03-16 RX ADMIN — Medication 12 MILLIGRAM(S): at 21:27

## 2023-03-16 NOTE — OB PROVIDER TRIAGE NOTE - NS_OBGYNHISTORY_OBGYN_ALL_OB_FT
ObHx:   h/o 1 ectopic pregnancy, treated with methotrexate  h/o 4 SAB, no D+C    GynHx: 13cm fibroid on sonos. Denies h/o ovarian cysts, abnormal PAPs, STIs.

## 2023-03-16 NOTE — OB PROVIDER TRIAGE NOTE - NSOBPROVIDERNOTE_OBGYN_ALL_OB_FT
A/P: 38yo  at 33w3d GA, with painless cervical dilation, with reassuring maternal and fetal status    - celestone #2 given  - f/u GBS, vaginitis    Dr. Reinoso aware A/P: 38yo  at 33w3d GA, with painless cervical dilation, for celestone #2, now with decreased FM, BPP 10/10 with reassuring maternal and fetal status  - celestone #2 given  - patient has scheduled NST/BPP on 3/17  - f/u GBS, vaginitis  -  labor precautions, fetal kick counts, PO maternal hydration     Dr. Reinoso aware

## 2023-03-16 NOTE — OB PROVIDER TRIAGE NOTE - HISTORY OF PRESENT ILLNESS
38yo  at 33w3d GA (SUKHDEEP 23, by LMP) presents to labor and delivery for celestone #2. Patient denies ctx, LOF, Vb. Good fetal movement.   Pregnancy complicated by dynamic cervical changes with mild funneling, on vaginal progesterone since 16wk GA.  Patient is consanguineous with partner, NIPT and AFP low risk.  Also with history of recurrent pregnancy loss, APLS workup negative during this pregnancy. Patient previously underwent IUI and IVF for pregnancy however this pregnancy was spontaneous. GBS unknown 38yo  at 33w3d GA (SUKHDEEP 23, by LMP) presents to labor and delivery for celestone #2. Patient endorsing fetal movement but decreased from baseline. Patient denies ctx, LOF, Vb.  Pregnancy complicated by dynamic cervical changes with mild funneling, on vaginal progesterone since 16wk GA.  Patient is consanguineous with partner, NIPT and AFP low risk.  Also with history of recurrent pregnancy loss, APLS workup negative during this pregnancy. Patient previously underwent IUI and IVF for pregnancy however this pregnancy was spontaneous. GBS unknown

## 2023-03-16 NOTE — OB PROVIDER TRIAGE NOTE - LABOR: BISHOP SCORE
Subjective:       Patient ID: Enio Earl is a 43 y.o. male.    Vitals:  temperature is 98.6 °F (37 °C).     Chief Complaint: COVID-19 Concerns    Patient coming in for covid-19 swab    Other   This is a new problem. The current episode started today. Pertinent negatives include no arthralgias, chest pain, chills, congestion, coughing, fatigue, fever, headaches, joint swelling, myalgias, nausea, rash, sore throat, vertigo or vomiting. Nothing aggravates the symptoms. He has tried nothing for the symptoms.       Constitution: Negative for chills, fatigue and fever.   HENT: Negative for congestion and sore throat.    Neck: Negative for painful lymph nodes.   Cardiovascular: Negative for chest pain and leg swelling.   Eyes: Negative for double vision and blurred vision.   Respiratory: Negative for cough and shortness of breath.    Gastrointestinal: Negative for nausea, vomiting and diarrhea.   Genitourinary: Negative for dysuria, frequency and urgency.   Musculoskeletal: Negative for joint pain, joint swelling, muscle cramps and muscle ache.   Skin: Negative for color change, pale and rash.   Allergic/Immunologic: Negative for seasonal allergies.   Neurological: Negative for dizziness, history of vertigo, light-headedness, passing out and headaches.   Hematologic/Lymphatic: Negative for swollen lymph nodes, easy bruising/bleeding and history of blood clots. Does not bruise/bleed easily.   Psychiatric/Behavioral: Negative for nervous/anxious, sleep disturbance and depression. The patient is not nervous/anxious.        Objective:      Physical Exam      Assessment:       1. SVT (supraventricular tachycardia)    2. Other specified cardiac arrhythmias        Plan:         SVT (supraventricular tachycardia)  -     COVID-19 Routine Screening    Other specified cardiac arrhythmias  -     COVID-19 Routine Screening            6

## 2023-03-16 NOTE — OB PROVIDER TRIAGE NOTE - NS_DISPOSITION_OBGYN_ALL_OB
"Christiano is a 62 year old who is being evaluated via a billable telephone visit.      What phone number would you like to be contacted at? [phone]  How would you like to obtain your AVS? Mail a copy    Assessment & Plan     Black stool  Stable, discussed with ADS.  They recommended urgent care for initial visit.  Nurse will notify pt.    Alcoholic cirrhosis, unspecified whether ascites present (H)  Stable per pt report.  Follows with [facility].           No follow-ups on file.    Ebony Osman, MARGARITO CNP  [facility] [place]    Subjective   Christiano is a 62 year old accompanied by his spouse, presenting for the following health issues:  Bowel Problems (Noticed dark, tarry stools)      HPI     Concern - Black, tarry stool  Onset: this morning  Description: black, tarry  Accompanying Signs & Symptoms: denies diarrhea, constipation  Previous history of similar problem: is concerned about his hemoglobin    Was seen 6/5/2021 at [facility] for fatigue, iron deficiency anemia.  Was also seen on 5/16/2021 and admitted to [facility] d/t and upper GI bleed, anemia d/t blood loss  Hx of alcoholic cirrhosis of liver    Rupert slightly off yesterday.  Has been changing diet to facilitate weight loss.  Yesterday had some nausea and mild stomach upset.  Decreased appetite.  Now this morning with black stool.  He has a hx of a GI bleed.  He had some back pain last week and utilized ibuprofen for a few days, but otherwise no regular use.  No chest pain, palpitations, sob.  Wife reports his skin tone looks normal.    Review of Systems   Constitutional, HEENT, cardiovascular, pulmonary, gi and gu systems are negative, except as otherwise noted.      Objective    Vitals - Patient Reported  Weight (Patient Reported): 86.5 kg (190 lb 9.6 oz)  Height (Patient Reported): 172.7 cm (5' 8\")  BMI (Based on Pt Reported Ht/Wt): 28.98  Temperature (Patient Reported): 97.5  F (36.4  C) (temporal)  Pain Score: Mild Pain (3)      Vitals:  No vitals " were obtained today due to virtual visit.    Physical Exam   healthy, alert and no distress  PSYCH: Alert and oriented times 3; coherent speech, normal   rate and volume, able to articulate logical thoughts, able   to abstract reason, no tangential thoughts, no hallucinations   or delusions  His affect is normal  RESP: No cough, no audible wheezing, able to talk in full sentences  Remainder of exam unable to be completed due to telephone visits                Phone call duration: 7 minutes     Continue to Observe Discharge

## 2023-03-16 NOTE — OB PROVIDER TRIAGE NOTE - NSHPPHYSICALEXAM_GEN_ALL_CORE
T(C): 36.5 (03-16-23 @ 21:10), Max: 36.5 (03-16-23 @ 21:10)  HR: 108 (03-16-23 @ 21:11) (108 - 108)  BP: 118/67 (03-16-23 @ 21:11) (118/67 - 118/67)  RR: 17 (03-16-23 @ 21:10) (17 - 17)    Gen: A+OX3. NAD  Abd: Soft, Nontender. Gravid. no palpable contractions  FHR: 130bpm/moderate variability/+accelerations/no decelerations  TOCO: irregular  SVE: 3/50/-3 vtx intact T(C): 36.5 (03-16-23 @ 21:10), Max: 36.5 (03-16-23 @ 21:10)  HR: 108 (03-16-23 @ 21:11) (108 - 108)  BP: 118/67 (03-16-23 @ 21:11) (118/67 - 118/67)  RR: 17 (03-16-23 @ 21:10) (17 - 17)    Gen: A+OX3. NAD  Abd: Soft, Nontender. Gravid. no palpable contractions  FHR: 130bpm/moderate variability/+accelerations/no decelerations  TOCO: irregular  SVE: 3/50/-3 vtx intact  BSS: Cephalic, posterior placenta, MVP T(C): 36.5 (03-16-23 @ 21:10), Max: 36.5 (03-16-23 @ 21:10)  HR: 108 (03-16-23 @ 21:11) (108 - 108)  BP: 118/67 (03-16-23 @ 21:11) (118/67 - 118/67)  RR: 17 (03-16-23 @ 21:10) (17 - 17)    Gen: A+OX3. NAD  Abd: Soft, Nontender. Gravid. no palpable contractions  FHR: 130bpm/moderate variability/+accelerations/no decelerations  TOCO: irregular  SVE: 3/50/-3 vtx intact  BSS: Cephalic, posterior placenta, MVP: 9.33cm, BPP: 10/10

## 2023-03-16 NOTE — OB RN TRIAGE NOTE - FALL HARM RISK - UNIVERSAL INTERVENTIONS
Bed in lowest position, wheels locked, appropriate side rails in place/Call bell, personal items and telephone in reach/Instruct patient to call for assistance before getting out of bed or chair/Non-slip footwear when patient is out of bed/[place] to call system/Physically safe environment - no spills, clutter or unnecessary equipment/Purposeful Proactive Rounding/Room/bathroom lighting operational, light cord in reach

## 2023-03-17 ENCOUNTER — APPOINTMENT (OUTPATIENT)
Dept: ANTEPARTUM | Facility: CLINIC | Age: 38
End: 2023-03-17
Payer: COMMERCIAL

## 2023-03-17 ENCOUNTER — OUTPATIENT (OUTPATIENT)
Dept: INPATIENT UNIT | Facility: HOSPITAL | Age: 38
LOS: 1 days | Discharge: HOME | End: 2023-03-17
Payer: COMMERCIAL

## 2023-03-17 ENCOUNTER — ASOB RESULT (OUTPATIENT)
Age: 38
End: 2023-03-17

## 2023-03-17 VITALS — HEART RATE: 68 BPM | SYSTOLIC BLOOD PRESSURE: 108 MMHG | DIASTOLIC BLOOD PRESSURE: 71 MMHG

## 2023-03-17 VITALS
DIASTOLIC BLOOD PRESSURE: 74 MMHG | HEIGHT: 60 IN | BODY MASS INDEX: 32.39 KG/M2 | HEART RATE: 86 BPM | WEIGHT: 165 LBS | SYSTOLIC BLOOD PRESSURE: 100 MMHG

## 2023-03-17 VITALS — SYSTOLIC BLOOD PRESSURE: 106 MMHG | DIASTOLIC BLOOD PRESSURE: 60 MMHG | HEART RATE: 77 BPM

## 2023-03-17 DIAGNOSIS — O36.8190 DECREASED FETAL MOVEMENTS, UNSPECIFIED TRIMESTER, NOT APPLICABLE OR UNSPECIFIED: ICD-10-CM

## 2023-03-17 DIAGNOSIS — O26.893 OTHER SPECIFIED PREGNANCY RELATED CONDITIONS, THIRD TRIMESTER: ICD-10-CM

## 2023-03-17 LAB
GROUP B BETA STREP DNA (PCR): SIGNIFICANT CHANGE UP
SOURCE GROUP B STREP: SIGNIFICANT CHANGE UP

## 2023-03-17 PROCEDURE — 59050 FETAL MONITOR W/REPORT: CPT

## 2023-03-17 PROCEDURE — 76818 FETAL BIOPHYS PROFILE W/NST: CPT

## 2023-03-17 PROCEDURE — 99214 OFFICE O/P EST MOD 30 MIN: CPT

## 2023-03-17 PROCEDURE — ZZZZZ: CPT

## 2023-03-17 PROCEDURE — 59025 FETAL NON-STRESS TEST: CPT

## 2023-03-17 NOTE — OB PROVIDER TRIAGE NOTE - NSHPPHYSICALEXAM_GEN_ALL_CORE
Physical exam:    Vital Signs Last 24 Hrs  T(F): 97.6 (17 Mar 2023 19:34), Max: 97.7 (16 Mar 2023 21:10)  HR: 77 (17 Mar 2023 19:34) (77 - 108)  BP: 106/60 (17 Mar 2023 19:34) (106/60 - 118/67)  RR: 18 (17 Mar 2023 19:34) (17 - 18)  SpO2: --    Gen: NAD  Abdomen: soft, gravid, nontender, with nonpalpable contractions    EFM: 120/mod/pos acc  toco: mpme  SVE: deferred, 3/50/-3 3/16 in triage   Sono: vertex, posterior placenta, MVP 8.1cm BPP 10/10

## 2023-03-17 NOTE — OB PROVIDER TRIAGE NOTE - HISTORY OF PRESENT ILLNESS
38yo  at 33w4d GA, SUKHDEEP 23 by LMP c/w first trimester sonogram, presented to L&D for prolonged monitoring after having nonreactive NST during her antepartum appointment today. Patient is s/p course of celestone 3/15-3/17. Patient endorsing fetal movement but decreased from baseline since her celestone course. BPP in the office was 8/8 today. Patient currently endorsing feeling fetal movement in triage. Denies contractions, leakage of fluid, vaginal bleeding. Pregnancy complicated by dynamic cervical changes with mild funneling, on vaginal progesterone since 16wk GA.  Patient is consanguineous with partner, NIPT and AFP low risk. History of recurrent pregnancy loss, APLS workup negative during this pregnancy. Patient previously underwent IUI and IVF for pregnancy however this pregnancy was spontaneous. GBS negative.

## 2023-03-17 NOTE — OB PROVIDER TRIAGE NOTE - NSOBPROVIDERNOTE_OBGYN_ALL_OB_FT
A/P: 36yo  at 33w4d GA, GBS negative, with BPP 8/10 for nonreactive NST outpatient, here for prolonged monitoring  -cont efm/toco monitoring  -monitor vitals  -PO hydration  -will reevaluate after 2 hours of monitoring    Dr. Espinoza and Dr. Reinoso aware A/P: 38yo  at 33w4d GA, GBS negative, with BPP 8/10 for nonreactive NST outpatient, here for prolonged monitoring  -cont efm/toco monitoring  -monitor vitals  -PO hydration  -will reevaluate after 2 hours of monitoring    Dr. Espinoza and Dr. Reinoso aware    Addendum: Patient s/p 3 hours of monitoring. NST has been reactive. BPP 10/10 with reassuring maternal and fetal status. No contractions noted on toco, patient not endorsing contractions. Endorsing fetal movement. Denies vaginal bleeding. Labor precautions given. FKC instructions given. PO maternal hydration encouraged. Patient to follow antepartum on 3/21 for scheduled NST and BPP. Patient to follow with OBGYN 3/28 for next scheduled appointment.

## 2023-03-17 NOTE — OB RN TRIAGE NOTE - NS_MODEOFARRIVAL_OBGYN_ALL_OB
Relevant Problems   No relevant active problems       Anesthetic History   No history of anesthetic complications            Review of Systems / Medical History  Patient summary reviewed and pertinent labs reviewed    Pulmonary        Sleep apnea: No treatment           Neuro/Psych         Psychiatric history     Cardiovascular              Hyperlipidemia    Exercise tolerance: >4 METS  Comments: Stress echo 2017 - normal EF, no ischemia, mild MR, mild AI   GI/Hepatic/Renal  Within defined limits              Endo/Other        Arthritis     Other Findings            Physical Exam    Airway  Mallampati: I  TM Distance: 4 - 6 cm  Neck ROM: normal range of motion   Mouth opening: Normal     Cardiovascular    Rhythm: regular  Rate: normal         Dental    Dentition: Full lower dentures and Full upper dentures     Pulmonary  Breath sounds clear to auscultation               Abdominal         Other Findings            Anesthetic Plan    ASA: 2  Anesthesia type: general            Anesthetic plan and risks discussed with: Patient
Car

## 2023-03-17 NOTE — OB PROVIDER TRIAGE NOTE - NSHPLABSRESULTS_GEN_ALL_CORE
2/24/23  vaginitis neg    2/7/23  B neg, neg antibody screen    1/13/23  GCT 99    11/21/22  GBS neg    11/15/22  AFP wnl    10/4/22  NIPT low risk    9/20/22  A1c 5.2  HepBSAg NR  HCV NR  HIV NR  TrepAb neg  rubella immune  measles immune  varicella immune    SONOS  33w4d: vertex, posterior, MVP 8.13cm, BPP 8/10 (nonreactive NST)  32w4d: vertex, posterior placenta, NST 10/10, EFW 2126gm (58%ile), left lateral subserosal fibroid 12.23x10.47x8.7 cm  27w4d: cervix 1.9cm  25w4d: cervix 3.35cm  24w2d: cervix 4.13cm  21w3d: incomplete anatomy scan with no gross abnormalities  12w1d: NT wnl

## 2023-03-18 LAB
A VAGINAE DNA VAG QL NAA+PROBE: SIGNIFICANT CHANGE UP
BVAB2 DNA VAG QL NAA+PROBE: SIGNIFICANT CHANGE UP
C ALBICANS DNA VAG QL NAA+PROBE: NEGATIVE — SIGNIFICANT CHANGE UP
C GLABRATA DNA VAG QL NAA+PROBE: NEGATIVE — SIGNIFICANT CHANGE UP
C KRUSEI DNA VAG QL NAA+PROBE: NEGATIVE — SIGNIFICANT CHANGE UP
C LUSITANIAE DNA VAG QL NAA+PROBE: NEGATIVE — SIGNIFICANT CHANGE UP
C TRACH RRNA SPEC QL NAA+PROBE: NEGATIVE — SIGNIFICANT CHANGE UP
MEGA1 DNA VAG QL NAA+PROBE: SIGNIFICANT CHANGE UP
N GONORRHOEA RRNA SPEC QL NAA+PROBE: NEGATIVE — SIGNIFICANT CHANGE UP
T VAGINALIS RRNA SPEC QL NAA+PROBE: NEGATIVE — SIGNIFICANT CHANGE UP

## 2023-03-20 LAB — BACTERIA UR CULT: NORMAL

## 2023-03-21 ENCOUNTER — APPOINTMENT (OUTPATIENT)
Dept: ANTEPARTUM | Facility: CLINIC | Age: 38
End: 2023-03-21
Payer: COMMERCIAL

## 2023-03-21 ENCOUNTER — ASOB RESULT (OUTPATIENT)
Age: 38
End: 2023-03-21

## 2023-03-21 VITALS
SYSTOLIC BLOOD PRESSURE: 95 MMHG | HEART RATE: 84 BPM | HEIGHT: 60 IN | BODY MASS INDEX: 32.79 KG/M2 | WEIGHT: 167 LBS | DIASTOLIC BLOOD PRESSURE: 60 MMHG

## 2023-03-21 PROCEDURE — ZZZZZ: CPT

## 2023-03-21 PROCEDURE — 76818 FETAL BIOPHYS PROFILE W/NST: CPT

## 2023-03-24 ENCOUNTER — APPOINTMENT (OUTPATIENT)
Dept: ANTEPARTUM | Facility: CLINIC | Age: 38
End: 2023-03-24
Payer: COMMERCIAL

## 2023-03-24 ENCOUNTER — ASOB RESULT (OUTPATIENT)
Age: 38
End: 2023-03-24

## 2023-03-24 VITALS
HEART RATE: 96 BPM | SYSTOLIC BLOOD PRESSURE: 100 MMHG | DIASTOLIC BLOOD PRESSURE: 68 MMHG | HEIGHT: 69 IN | BODY MASS INDEX: 24.14 KG/M2 | WEIGHT: 163 LBS

## 2023-03-24 PROCEDURE — 76818 FETAL BIOPHYS PROFILE W/NST: CPT

## 2023-03-24 PROCEDURE — ZZZZZ: CPT

## 2023-03-27 ENCOUNTER — APPOINTMENT (OUTPATIENT)
Dept: ANTEPARTUM | Facility: CLINIC | Age: 38
End: 2023-03-27

## 2023-03-28 ENCOUNTER — APPOINTMENT (OUTPATIENT)
Dept: OBGYN | Facility: CLINIC | Age: 38
End: 2023-03-28
Payer: COMMERCIAL

## 2023-03-28 VITALS
BODY MASS INDEX: 23.85 KG/M2 | SYSTOLIC BLOOD PRESSURE: 105 MMHG | DIASTOLIC BLOOD PRESSURE: 77 MMHG | TEMPERATURE: 98.6 F | HEART RATE: 87 BPM | WEIGHT: 161 LBS | HEIGHT: 69 IN

## 2023-03-28 DIAGNOSIS — R10.2 OTHER SPECIFIED PREGNANCY RELATED CONDITIONS, UNSPECIFIED TRIMESTER: ICD-10-CM

## 2023-03-28 DIAGNOSIS — O26.899 OTHER SPECIFIED PREGNANCY RELATED CONDITIONS, UNSPECIFIED TRIMESTER: ICD-10-CM

## 2023-03-28 DIAGNOSIS — Z3A.35 35 WEEKS GESTATION OF PREGNANCY: ICD-10-CM

## 2023-03-28 DIAGNOSIS — D21.9 BENIGN NEOPLASM OF CONNECTIVE AND OTHER SOFT TISSUE, UNSPECIFIED: ICD-10-CM

## 2023-03-28 DIAGNOSIS — Z67.91 OTHER SPECIFIED PREGNANCY RELATED CONDITIONS, UNSPECIFIED TRIMESTER: ICD-10-CM

## 2023-03-28 DIAGNOSIS — K21.9 GASTRO-ESOPHAGEAL REFLUX DISEASE W/OUT ESOPHAGITIS: ICD-10-CM

## 2023-03-28 DIAGNOSIS — Z71.89 OTHER SPECIFIED COUNSELING: ICD-10-CM

## 2023-03-28 DIAGNOSIS — O09.90 SUPERVISION OF HIGH RISK PREGNANCY, UNSPECIFIED, UNSPECIFIED TRIMESTER: ICD-10-CM

## 2023-03-28 DIAGNOSIS — O09.529 SUPERVISION OF ELDERLY MULTIGRAVIDA, UNSPECIFIED TRIMESTER: ICD-10-CM

## 2023-03-28 LAB
BILIRUB UR QL STRIP: NORMAL
CLARITY UR: CLEAR
COLLECTION METHOD: NORMAL
GLUCOSE UR-MCNC: NORMAL
HCG UR QL: 0.2 EU/DL
HGB UR QL STRIP.AUTO: NORMAL
KETONES UR-MCNC: NORMAL
LEUKOCYTE ESTERASE UR QL STRIP: NORMAL
NITRITE UR QL STRIP: NORMAL
PH UR STRIP: 6.5
PROT UR STRIP-MCNC: NORMAL
SP GR UR STRIP: 1.01

## 2023-03-28 PROCEDURE — 81003 URINALYSIS AUTO W/O SCOPE: CPT | Mod: QW

## 2023-03-28 PROCEDURE — 99213 OFFICE O/P EST LOW 20 MIN: CPT | Mod: TH

## 2023-03-31 ENCOUNTER — APPOINTMENT (OUTPATIENT)
Dept: ANTEPARTUM | Facility: CLINIC | Age: 38
End: 2023-03-31
Payer: COMMERCIAL

## 2023-03-31 ENCOUNTER — ASOB RESULT (OUTPATIENT)
Age: 38
End: 2023-03-31

## 2023-03-31 VITALS
HEART RATE: 82 BPM | WEIGHT: 163 LBS | HEIGHT: 69 IN | DIASTOLIC BLOOD PRESSURE: 78 MMHG | SYSTOLIC BLOOD PRESSURE: 110 MMHG | BODY MASS INDEX: 24.14 KG/M2

## 2023-03-31 PROCEDURE — 76818 FETAL BIOPHYS PROFILE W/NST: CPT

## 2023-03-31 PROCEDURE — ZZZZZ: CPT

## 2023-04-03 ENCOUNTER — NON-APPOINTMENT (OUTPATIENT)
Age: 38
End: 2023-04-03

## 2023-04-04 ENCOUNTER — APPOINTMENT (OUTPATIENT)
Dept: ANTEPARTUM | Facility: CLINIC | Age: 38
End: 2023-04-04
Payer: COMMERCIAL

## 2023-04-04 ENCOUNTER — ASOB RESULT (OUTPATIENT)
Age: 38
End: 2023-04-04

## 2023-04-04 VITALS
HEIGHT: 69 IN | HEART RATE: 87 BPM | SYSTOLIC BLOOD PRESSURE: 114 MMHG | DIASTOLIC BLOOD PRESSURE: 80 MMHG | WEIGHT: 167 LBS | BODY MASS INDEX: 24.73 KG/M2

## 2023-04-04 PROCEDURE — 76818 FETAL BIOPHYS PROFILE W/NST: CPT

## 2023-04-04 PROCEDURE — ZZZZZ: CPT

## 2023-04-05 ENCOUNTER — NON-APPOINTMENT (OUTPATIENT)
Age: 38
End: 2023-04-05

## 2023-04-05 ENCOUNTER — INPATIENT (INPATIENT)
Facility: HOSPITAL | Age: 38
LOS: 3 days | Discharge: HOME | End: 2023-04-09
Attending: OBSTETRICS & GYNECOLOGY | Admitting: OBSTETRICS & GYNECOLOGY
Payer: COMMERCIAL

## 2023-04-05 VITALS — SYSTOLIC BLOOD PRESSURE: 103 MMHG | HEART RATE: 67 BPM | DIASTOLIC BLOOD PRESSURE: 60 MMHG

## 2023-04-05 DIAGNOSIS — O36.0930 MATERNAL CARE FOR OTHER RHESUS ISOIMMUNIZATION, THIRD TRIMESTER, NOT APPLICABLE OR UNSPECIFIED: ICD-10-CM

## 2023-04-05 DIAGNOSIS — D62 ACUTE POSTHEMORRHAGIC ANEMIA: ICD-10-CM

## 2023-04-05 DIAGNOSIS — O26.893 OTHER SPECIFIED PREGNANCY RELATED CONDITIONS, THIRD TRIMESTER: ICD-10-CM

## 2023-04-05 LAB
ALLERGY+IMMUNOLOGY DIAG STUDY NOTE: SIGNIFICANT CHANGE UP
AMPHET UR-MCNC: NEGATIVE — SIGNIFICANT CHANGE UP
APPEARANCE UR: CLEAR — SIGNIFICANT CHANGE UP
BACTERIA # UR AUTO: NEGATIVE — SIGNIFICANT CHANGE UP
BARBITURATES UR SCN-MCNC: NEGATIVE — SIGNIFICANT CHANGE UP
BASOPHILS # BLD AUTO: 0.01 K/UL — SIGNIFICANT CHANGE UP (ref 0–0.2)
BASOPHILS NFR BLD AUTO: 0.1 % — SIGNIFICANT CHANGE UP (ref 0–1)
BENZODIAZ UR-MCNC: NEGATIVE — SIGNIFICANT CHANGE UP
BILIRUB UR-MCNC: NEGATIVE — SIGNIFICANT CHANGE UP
BLD GP AB SCN SERPL QL: SIGNIFICANT CHANGE UP
BUPRENORPHINE SCREEN, URINE RESULT: NEGATIVE — SIGNIFICANT CHANGE UP
COCAINE METAB.OTHER UR-MCNC: NEGATIVE — SIGNIFICANT CHANGE UP
COLOR SPEC: COLORLESS — SIGNIFICANT CHANGE UP
DIFF PNL FLD: NEGATIVE — SIGNIFICANT CHANGE UP
DIR ANTIGLOB POLYSPECIFIC INTERPRETATION: SIGNIFICANT CHANGE UP
EOSINOPHIL # BLD AUTO: 0.03 K/UL — SIGNIFICANT CHANGE UP (ref 0–0.7)
EOSINOPHIL NFR BLD AUTO: 0.4 % — SIGNIFICANT CHANGE UP (ref 0–8)
EPI CELLS # UR: 1 /HPF — SIGNIFICANT CHANGE UP (ref 0–5)
FENTANYL UR QL: NEGATIVE — SIGNIFICANT CHANGE UP
GLUCOSE UR QL: NEGATIVE — SIGNIFICANT CHANGE UP
HCT VFR BLD CALC: 36.2 % — LOW (ref 37–47)
HGB BLD-MCNC: 11.7 G/DL — LOW (ref 12–16)
HYALINE CASTS # UR AUTO: 0 /LPF — SIGNIFICANT CHANGE UP (ref 0–7)
IMM GRANULOCYTES NFR BLD AUTO: 0.6 % — HIGH (ref 0.1–0.3)
KETONES UR-MCNC: NEGATIVE — SIGNIFICANT CHANGE UP
L&D DRUG SCREEN, URINE: SIGNIFICANT CHANGE UP
LEUKOCYTE ESTERASE UR-ACNC: ABNORMAL
LYMPHOCYTES # BLD AUTO: 2.72 K/UL — SIGNIFICANT CHANGE UP (ref 1.2–3.4)
LYMPHOCYTES # BLD AUTO: 40.7 % — SIGNIFICANT CHANGE UP (ref 20.5–51.1)
MCHC RBC-ENTMCNC: 28.4 PG — SIGNIFICANT CHANGE UP (ref 27–31)
MCHC RBC-ENTMCNC: 32.3 G/DL — SIGNIFICANT CHANGE UP (ref 32–37)
MCV RBC AUTO: 87.9 FL — SIGNIFICANT CHANGE UP (ref 81–99)
METHADONE UR-MCNC: NEGATIVE — SIGNIFICANT CHANGE UP
MONOCYTES # BLD AUTO: 0.37 K/UL — SIGNIFICANT CHANGE UP (ref 0.1–0.6)
MONOCYTES NFR BLD AUTO: 5.5 % — SIGNIFICANT CHANGE UP (ref 1.7–9.3)
NEUTROPHILS # BLD AUTO: 3.51 K/UL — SIGNIFICANT CHANGE UP (ref 1.4–6.5)
NEUTROPHILS NFR BLD AUTO: 52.7 % — SIGNIFICANT CHANGE UP (ref 42.2–75.2)
NITRITE UR-MCNC: NEGATIVE — SIGNIFICANT CHANGE UP
NRBC # BLD: 0 /100 WBCS — SIGNIFICANT CHANGE UP (ref 0–0)
OPIATES UR-MCNC: NEGATIVE — SIGNIFICANT CHANGE UP
OXYCODONE UR-MCNC: NEGATIVE — SIGNIFICANT CHANGE UP
PCP UR-MCNC: NEGATIVE — SIGNIFICANT CHANGE UP
PH UR: 6.5 — SIGNIFICANT CHANGE UP (ref 5–8)
PLATELET # BLD AUTO: 200 K/UL — SIGNIFICANT CHANGE UP (ref 130–400)
PRENATAL SYPHILIS TEST: SIGNIFICANT CHANGE UP
PROPOXYPHENE QUALITATIVE URINE RESULT: NEGATIVE — SIGNIFICANT CHANGE UP
PROT UR-MCNC: NEGATIVE — SIGNIFICANT CHANGE UP
RBC # BLD: 4.12 M/UL — LOW (ref 4.2–5.4)
RBC # FLD: 14.7 % — HIGH (ref 11.5–14.5)
RBC CASTS # UR COMP ASSIST: 1 /HPF — SIGNIFICANT CHANGE UP (ref 0–4)
SP GR SPEC: 1 — LOW (ref 1.01–1.03)
UROBILINOGEN FLD QL: SIGNIFICANT CHANGE UP
WBC # BLD: 6.68 K/UL — SIGNIFICANT CHANGE UP (ref 4.8–10.8)
WBC # FLD AUTO: 6.68 K/UL — SIGNIFICANT CHANGE UP (ref 4.8–10.8)
WBC UR QL: 1 /HPF — SIGNIFICANT CHANGE UP (ref 0–5)

## 2023-04-05 RX ORDER — SODIUM CHLORIDE 9 MG/ML
1000 INJECTION, SOLUTION INTRAVENOUS
Refills: 0 | Status: DISCONTINUED | OUTPATIENT
Start: 2023-04-05 | End: 2023-04-06

## 2023-04-05 NOTE — OB RN TRIAGE NOTE - FALL HARM RISK - UNIVERSAL INTERVENTIONS
Bed in lowest position, wheels locked, appropriate side rails in place/Call bell, personal items and telephone in reach/Instruct patient to call for assistance before getting out of bed or chair/Non-slip footwear when patient is out of bed/[place] to call system/Physically safe environment - no spills, clutter or unnecessary equipment/Purposeful Proactive Rounding/Room/bathroom lighting operational, light cord in reach

## 2023-04-05 NOTE — OB PROVIDER TRIAGE NOTE - NSHPPHYSICALEXAM_GEN_ALL_CORE
T(C): 36.7 (04-05-23 @ 07:11), Max: 36.7 (04-05-23 @ 07:11)  HR: 60 (04-05-23 @ 11:33) (56 - 96)  BP: 110/56 (04-05-23 @ 10:26) (103/60 - 110/56)  RR: 20 (04-05-23 @ 07:11) (20 - 20)  SpO2: 98% (04-05-23 @ 11:33) (90% - 99%)    EFM: 130 bpm, moderate variability, +accels  TOCO: q 2-3 mins    Abd: soft, gravid, nontender

## 2023-04-05 NOTE — OB PROVIDER TRIAGE NOTE - HISTORY OF PRESENT ILLNESS
38yo  at 36.2 weeks by LMP, SUKHDEEP 23, presented to L&D for contractions. Contractions began last night at 2200, q 5 minutes, 8/10 in intensity. Patient is s/p course of celestone 3/15-3/17. Denies leakage of fluid and vaginal bleeding. Pregnancy complicated by dynamic cervical changes with mild funneling, on vaginal progesterone since 16wk GA. Patient is consanguineous with partner, NIPT and AFP low risk. History of recurrent pregnancy loss, APLS workup negative during this pregnancy. Patient previously underwent IUI and IVF for pregnancy however this pregnancy was spontaneous. GBS negative.  36yo  at 36.2 weeks by LMP, SUKHDEEP 23, presented to L&D for contractions. Contractions began last night at 2200, q 5 minutes, 8/10 in intensity. Patient is s/p course of celestone 3/15-3/17. Denies leakage of fluid and vaginal bleeding. Pregnancy complicated by dynamic cervical changes with mild funneling, on vaginal progesterone since 16wk GA. Patient is consanguineous with partner, NIPT and AFP low risk. History of recurrent pregnancy loss, APLS workup negative during this pregnancy. Patient previously underwent IUI and IVF for pregnancy however this pregnancy was spontaneous. GBS negative.

## 2023-04-05 NOTE — OB RN TRIAGE NOTE - NS_TRIAGEADDITIONAL COMMENTS_OBGYN_ALL_OB_FT
1919 Pt received in HRB A+Ox4 in no acute distress. Pt does not appreciates ctx, denies vaginal bleeding or fluid leaking. Continues  EFM/TOCO in progress. Pt educated on same and verbalized understanding. Educated on call bell use as needed, Pt verbalized understanding. 1919 Pt received in HRB A+Ox4 in no acute distress. Pt does not appreciates ctx, denies vaginal bleeding or fluid leaking. Continues  EFM/TOCO in progress. Pt educated on same and verbalized understanding. Educated on call bell use as needed, Pt verbalized understanding.  Pt reported painful abdominal ctx after ambulating to the bathroom at 0310. IV Fluid bolus initiated via R forearm. Tylenol IV initiated. Pt reports vaginal spotting after using restroom, denies fluid leaking.

## 2023-04-05 NOTE — OB PROVIDER TRIAGE NOTE - NSHPLABSRESULTS_GEN_ALL_CORE
Date: 2/24/23  vaginitis neg    2/7/23  B neg, neg antibody screen    1/13/23  GCT 99    11/21/22  GBS neg    11/15/22  AFP wnl    10/4/22  NIPT low risk    9/20/22  A1c 5.2  HepBSAg NR  HCV NR  HIV NR  TrepAb neg  rubella immune  measles immune  varicella immune    SONOS  33w4d: vertex, posterior, MVP 8.13cm, BPP 8/10 (nonreactive NST)  32w4d: vertex, posterior placenta, NST 10/10, EFW 2126gm (58%ile), left lateral subserosal fibroid 12.23x10.47x8.7 cm  27w4d: cervix 1.9cm  25w4d: cervix 3.35cm  24w2d: cervix 4.13cm  21w3d: incomplete anatomy scan with no gross abnormalities  12w1d: NT wnl

## 2023-04-05 NOTE — OB PROVIDER TRIAGE NOTE - NSOBPROVIDERNOTE_OBGYN_ALL_OB_FT
37y  @ 36.2 weeks, GBS negative, not in labor.    -Transfer to Rec D  -Labs (admission, Ucx)  -IVF  -Continuous EFM/TOCO  -Reevaluate in 4 hours    Dr Reinoso aware    Addendum:  Patient stats she is feeling better. Contractions have spaced out.     SVE: 3.5/50/-2, intact  [place]: irregular, q 10+ minutes    Dr Reinoso aware 37y  @ 36.2 weeks, GBS negative, not in labor.    -Transfer to Rec D  -Labs (admission, Ucx)  -IVF  -Continuous EFM/TOCO  -Reevaluate in 4 hours    Dr Reinoso aware    Addendum:  Patient stats she is feeling better. Contractions have spaced out.     SVE: 3.5/50/-2, intact  [place]: irregular, q 10+ minutes    Dr Reinoso aware

## 2023-04-05 NOTE — OB PROVIDER TRIAGE NOTE - ATTENDING COMMENTS
38yo  @ 36 2/7 wks called this AM stating that her baseline pain has significantly increased and also feeling CTXs every 2-4 minutes and sometimes pain feels constant.  She denies VB or LOF and although in the past had decreased FM< states recent movement has been vigorous. She has been getting monitoring with MFM and was using vaginal progesterone for short cervix and noted to have premature cervical dilation in the office.  Last exam she was 3--/-3 in office.  When she arrived she was noted to have regular CTXs q 2 minutes and was given IV hydration ands monitoring.  She was examined by another provider and SVE: 3-/-3.  She has large fibroid uterus that also cause pain and discomfort during pregnancy.  She is AMA with h/o recurrent SAB as well.  She has had no other significant comorbidities during pregnancy.  When the premature cervical dilation was noted she was given steroids for possible PTD. GBS negative.    After hydration and rest, the CTXs were noted to space out to 10-15 minutes apart, but then picked up every 7-10 minutes.  Due to patient social complications and living far from hospital it was decided to let her ambulate and eat a meal since FHT was reactive and to reassess.  When she arrived back from ambulation, I examined patient and SVE: /-3.  This was a clear change in exam from my last exam in office but unclear if change from first arrival.  Due to prematurity and premature contractions and dilation, will continue to evaluate and prolong monitoring for labor.  Patient counseled on recommendations and mgmt planning and is in agreement.     FHT: 120s, moderate variability, + accelerations, no decelerations, CTXs irregular currently (7-10min)    A: 38yo  @ 36+ wks,  CTXs and premature cervical dilation r/o labor, fibroid uterus, AMA    P: continue to monitor closely      IV hydration PRN      continuous fetal monitoring     pain mgmt PRN      will reassess as clinically indicated

## 2023-04-06 ENCOUNTER — RESULT REVIEW (OUTPATIENT)
Age: 38
End: 2023-04-06

## 2023-04-06 ENCOUNTER — TRANSCRIPTION ENCOUNTER (OUTPATIENT)
Age: 38
End: 2023-04-06

## 2023-04-06 DIAGNOSIS — O26.899 OTHER SPECIFIED PREGNANCY RELATED CONDITIONS, UNSPECIFIED TRIMESTER: ICD-10-CM

## 2023-04-06 LAB
GLUCOSE BLDC GLUCOMTR-MCNC: 90 MG/DL — SIGNIFICANT CHANGE UP (ref 70–99)
SARS-COV-2 RNA SPEC QL NAA+PROBE: SIGNIFICANT CHANGE UP

## 2023-04-06 PROCEDURE — 87635 SARS-COV-2 COVID-19 AMP PRB: CPT

## 2023-04-06 PROCEDURE — 85025 COMPLETE CBC W/AUTO DIFF WBC: CPT

## 2023-04-06 PROCEDURE — 81001 URINALYSIS AUTO W/SCOPE: CPT

## 2023-04-06 PROCEDURE — 88307 TISSUE EXAM BY PATHOLOGIST: CPT | Mod: 26

## 2023-04-06 PROCEDURE — 87086 URINE CULTURE/COLONY COUNT: CPT

## 2023-04-06 PROCEDURE — 82962 GLUCOSE BLOOD TEST: CPT

## 2023-04-06 PROCEDURE — 90384 RH IG FULL-DOSE IM: CPT

## 2023-04-06 PROCEDURE — 88307 TISSUE EXAM BY PATHOLOGIST: CPT

## 2023-04-06 PROCEDURE — 36415 COLL VENOUS BLD VENIPUNCTURE: CPT

## 2023-04-06 PROCEDURE — 59025 FETAL NON-STRESS TEST: CPT

## 2023-04-06 PROCEDURE — 59514 CESAREAN DELIVERY ONLY: CPT

## 2023-04-06 PROCEDURE — 85461 HEMOGLOBIN FETAL: CPT

## 2023-04-06 PROCEDURE — 86922 COMPATIBILITY TEST ANTIGLOB: CPT

## 2023-04-06 PROCEDURE — 80053 COMPREHEN METABOLIC PANEL: CPT

## 2023-04-06 RX ORDER — ENOXAPARIN SODIUM 100 MG/ML
40 INJECTION SUBCUTANEOUS EVERY 24 HOURS
Refills: 0 | Status: DISCONTINUED | OUTPATIENT
Start: 2023-04-06 | End: 2023-04-09

## 2023-04-06 RX ORDER — AMPICILLIN TRIHYDRATE 250 MG
2 CAPSULE ORAL EVERY 6 HOURS
Refills: 0 | Status: COMPLETED | OUTPATIENT
Start: 2023-04-07 | End: 2023-04-07

## 2023-04-06 RX ORDER — GENTAMICIN SULFATE 40 MG/ML
80 VIAL (ML) INJECTION EVERY 8 HOURS
Refills: 0 | Status: COMPLETED | OUTPATIENT
Start: 2023-04-06 | End: 2023-04-07

## 2023-04-06 RX ORDER — ONDANSETRON 8 MG/1
4 TABLET, FILM COATED ORAL EVERY 6 HOURS
Refills: 0 | Status: DISCONTINUED | OUTPATIENT
Start: 2023-04-06 | End: 2023-04-06

## 2023-04-06 RX ORDER — OXYCODONE HYDROCHLORIDE 5 MG/1
5 TABLET ORAL ONCE
Refills: 0 | Status: DISCONTINUED | OUTPATIENT
Start: 2023-04-06 | End: 2023-04-09

## 2023-04-06 RX ORDER — ONDANSETRON 8 MG/1
4 TABLET, FILM COATED ORAL ONCE
Refills: 0 | Status: DISCONTINUED | OUTPATIENT
Start: 2023-04-06 | End: 2023-04-06

## 2023-04-06 RX ORDER — OXYCODONE HYDROCHLORIDE 5 MG/1
5 TABLET ORAL
Refills: 0 | Status: COMPLETED | OUTPATIENT
Start: 2023-04-06 | End: 2023-04-13

## 2023-04-06 RX ORDER — MORPHINE SULFATE 50 MG/1
2 CAPSULE, EXTENDED RELEASE ORAL ONCE
Refills: 0 | Status: DISCONTINUED | OUTPATIENT
Start: 2023-04-06 | End: 2023-04-06

## 2023-04-06 RX ORDER — SENNA PLUS 8.6 MG/1
2 TABLET ORAL AT BEDTIME
Refills: 0 | Status: DISCONTINUED | OUTPATIENT
Start: 2023-04-06 | End: 2023-04-09

## 2023-04-06 RX ORDER — AMPICILLIN TRIHYDRATE 250 MG
CAPSULE ORAL
Refills: 0 | Status: COMPLETED | OUTPATIENT
Start: 2023-04-06 | End: 2023-04-07

## 2023-04-06 RX ORDER — TETANUS TOXOID, REDUCED DIPHTHERIA TOXOID AND ACELLULAR PERTUSSIS VACCINE, ADSORBED 5; 2.5; 8; 8; 2.5 [IU]/.5ML; [IU]/.5ML; UG/.5ML; UG/.5ML; UG/.5ML
0.5 SUSPENSION INTRAMUSCULAR ONCE
Refills: 0 | Status: DISCONTINUED | OUTPATIENT
Start: 2023-04-06 | End: 2023-04-09

## 2023-04-06 RX ORDER — SODIUM CHLORIDE 9 MG/ML
1000 INJECTION, SOLUTION INTRAVENOUS
Refills: 0 | Status: DISCONTINUED | OUTPATIENT
Start: 2023-04-06 | End: 2023-04-07

## 2023-04-06 RX ORDER — OXYTOCIN 10 UNIT/ML
333.33 VIAL (ML) INJECTION
Qty: 20 | Refills: 0 | Status: DISCONTINUED | OUTPATIENT
Start: 2023-04-06 | End: 2023-04-09

## 2023-04-06 RX ORDER — KETOROLAC TROMETHAMINE 30 MG/ML
30 SYRINGE (ML) INJECTION ONCE
Refills: 0 | Status: DISCONTINUED | OUTPATIENT
Start: 2023-04-06 | End: 2023-04-06

## 2023-04-06 RX ORDER — DIPHENHYDRAMINE HCL 50 MG
50 CAPSULE ORAL ONCE
Refills: 0 | Status: COMPLETED | OUTPATIENT
Start: 2023-04-06 | End: 2023-04-06

## 2023-04-06 RX ORDER — MORPHINE SULFATE 50 MG/1
4 CAPSULE, EXTENDED RELEASE ORAL ONCE
Refills: 0 | Status: DISCONTINUED | OUTPATIENT
Start: 2023-04-06 | End: 2023-04-06

## 2023-04-06 RX ORDER — CEFAZOLIN SODIUM 1 G
2000 VIAL (EA) INJECTION ONCE
Refills: 0 | Status: DISCONTINUED | OUTPATIENT
Start: 2023-04-06 | End: 2023-04-06

## 2023-04-06 RX ORDER — AMPICILLIN TRIHYDRATE 250 MG
2 CAPSULE ORAL ONCE
Refills: 0 | Status: COMPLETED | OUTPATIENT
Start: 2023-04-06 | End: 2023-04-06

## 2023-04-06 RX ORDER — ACETAMINOPHEN 500 MG
1000 TABLET ORAL ONCE
Refills: 0 | Status: COMPLETED | OUTPATIENT
Start: 2023-04-06 | End: 2023-04-06

## 2023-04-06 RX ORDER — FENTANYL/BUPIVACAINE/NS/PF 2MCG/ML-.1
250 PLASTIC BAG, INJECTION (ML) INJECTION
Refills: 0 | Status: DISCONTINUED | OUTPATIENT
Start: 2023-04-06 | End: 2023-04-06

## 2023-04-06 RX ORDER — SODIUM CHLORIDE 9 MG/ML
1000 INJECTION, SOLUTION INTRAVENOUS
Refills: 0 | Status: DISCONTINUED | OUTPATIENT
Start: 2023-04-06 | End: 2023-04-06

## 2023-04-06 RX ORDER — DEXAMETHASONE 0.5 MG/5ML
4 ELIXIR ORAL EVERY 6 HOURS
Refills: 0 | Status: DISCONTINUED | OUTPATIENT
Start: 2023-04-06 | End: 2023-04-06

## 2023-04-06 RX ORDER — NALOXONE HYDROCHLORIDE 4 MG/.1ML
0.1 SPRAY NASAL
Refills: 0 | Status: DISCONTINUED | OUTPATIENT
Start: 2023-04-06 | End: 2023-04-06

## 2023-04-06 RX ORDER — KETOROLAC TROMETHAMINE 30 MG/ML
30 SYRINGE (ML) INJECTION EVERY 6 HOURS
Refills: 0 | Status: DISCONTINUED | OUTPATIENT
Start: 2023-04-06 | End: 2023-04-07

## 2023-04-06 RX ORDER — MAGNESIUM HYDROXIDE 400 MG/1
30 TABLET, CHEWABLE ORAL
Refills: 0 | Status: DISCONTINUED | OUTPATIENT
Start: 2023-04-06 | End: 2023-04-09

## 2023-04-06 RX ORDER — SIMETHICONE 80 MG/1
80 TABLET, CHEWABLE ORAL EVERY 6 HOURS
Refills: 0 | Status: DISCONTINUED | OUTPATIENT
Start: 2023-04-06 | End: 2023-04-09

## 2023-04-06 RX ORDER — OXYTOCIN 10 UNIT/ML
333.33 VIAL (ML) INJECTION
Qty: 20 | Refills: 0 | Status: DISCONTINUED | OUTPATIENT
Start: 2023-04-06 | End: 2023-04-06

## 2023-04-06 RX ORDER — IBUPROFEN 200 MG
600 TABLET ORAL EVERY 6 HOURS
Refills: 0 | Status: COMPLETED | OUTPATIENT
Start: 2023-04-06 | End: 2024-03-04

## 2023-04-06 RX ORDER — OXYTOCIN 10 UNIT/ML
2 VIAL (ML) INJECTION
Qty: 30 | Refills: 0 | Status: DISCONTINUED | OUTPATIENT
Start: 2023-04-06 | End: 2023-04-06

## 2023-04-06 RX ORDER — DIPHENHYDRAMINE HCL 50 MG
25 CAPSULE ORAL EVERY 6 HOURS
Refills: 0 | Status: DISCONTINUED | OUTPATIENT
Start: 2023-04-06 | End: 2023-04-09

## 2023-04-06 RX ORDER — LANOLIN
1 OINTMENT (GRAM) TOPICAL EVERY 6 HOURS
Refills: 0 | Status: DISCONTINUED | OUTPATIENT
Start: 2023-04-06 | End: 2023-04-09

## 2023-04-06 RX ORDER — ACETAMINOPHEN 500 MG
975 TABLET ORAL
Refills: 0 | Status: DISCONTINUED | OUTPATIENT
Start: 2023-04-06 | End: 2023-04-09

## 2023-04-06 RX ORDER — AZITHROMYCIN 500 MG/1
500 TABLET, FILM COATED ORAL ONCE
Refills: 0 | Status: DISCONTINUED | OUTPATIENT
Start: 2023-04-06 | End: 2023-04-06

## 2023-04-06 RX ADMIN — Medication 400 MILLIGRAM(S): at 23:39

## 2023-04-06 RX ADMIN — Medication 104 MILLIGRAM(S): at 00:12

## 2023-04-06 RX ADMIN — MORPHINE SULFATE 4 MILLIGRAM(S): 50 CAPSULE, EXTENDED RELEASE ORAL at 23:17

## 2023-04-06 RX ADMIN — Medication 100 MILLIGRAM(S): at 23:39

## 2023-04-06 RX ADMIN — SODIUM CHLORIDE 125 MILLILITER(S): 9 INJECTION, SOLUTION INTRAVENOUS at 03:41

## 2023-04-06 RX ADMIN — Medication 50 MILLIGRAM(S): at 23:45

## 2023-04-06 RX ADMIN — Medication 200 GRAM(S): at 23:38

## 2023-04-06 RX ADMIN — Medication 400 MILLIGRAM(S): at 03:40

## 2023-04-06 NOTE — CHART NOTE - NSCHARTNOTEFT_GEN_A_CORE
PGY 3 Note    Patient seen and evaluated at bedside for prolonged fetal deceleration. Resusitated with IV bolus and repositioning, SVE unchanged 7/80/-3 FSE placed. Fetal heart rate did not recovered. Patient transported emergently to the OR for emergency .       Dr. Reinoso aware

## 2023-04-06 NOTE — OB PROVIDER H&P - NSMODPPHRISK_OBGYN_A_OB
Over-distended uterus: Multiple gestation, polyhydramnios, Macrosomia/Large Uterine fibroids/AMA - over 35 years of age

## 2023-04-06 NOTE — OB RN DELIVERY SUMMARY - NSSELHIDDEN_OBGYN_ALL_OB_FT
[NS_DeliveryAttending1_OBGYN_ALL_OB_FT:[id number]],[NS_DeliveryAssist1_OBGYN_ALL_OB_FT:[email or web address]=],[NS_DeliveryRN_OBGYN_ALL_OB_FT:[id number]=],[NS_CirculateRN2_OBGYN_ALL_OB_FT:[id number]=]

## 2023-04-06 NOTE — PROGRESS NOTE ADULT - SUBJECTIVE AND OBJECTIVE BOX
patient examined for progression of labor, comfortable with epidural    Speculum exam due to HSV1 serology (never had h/o lesions or sx) was negative for lesions    SVE: /-3- but ballotable head due to polyhydramnios- not safe to needle membranes at this time    FHT: 130s, moderate variability, + accelerations, no decelerations, CTXs irregular    Patient counseled on starting pitocin augmentation due to irregular CTXs to also try to allow the fetal head to better apply to cervix for ROM.  We discussed risks with polyhydramnios and ROM and cord prolapse that could result in an emergency  and all the risks previously discussed (hemorrhage, blood transfusion, myomectomy/hysterectomy, classical incision, etc).  Patient understood all counseling and is in agreement with mgmt plan.     A: 38yo  @ 36+wks in  labor, polyhydramnios, large fibroid uterus, AMA    P: continue to monitor closely      IV hydration     continuous fetal monitoring      pitocin augmentation as tolerated       epidural in place        venodyne compression        will reassess as clinically indicated

## 2023-04-06 NOTE — OB PROVIDER DELIVERY SUMMARY - NSSELHIDDEN_OBGYN_ALL_OB_FT
[NS_DeliveryAttending1_OBGYN_ALL_OB_FT:[id number]],[NS_DeliveryAssist1_OBGYN_ALL_OB_FT:[id number]],[NS_DeliveryRN_OBGYN_ALL_OB_FT:[id number]=],[NS_CirculateRN2_OBGYN_ALL_OB_FT:[id number]=]

## 2023-04-06 NOTE — OB RN INTRAOPERATIVE NOTE - NSSELHIDDEN_OBGYN_ALL_OB_FT
[NS_DeliveryAttending1_OBGYN_ALL_OB_FT:[id number]],[NS_DeliveryAssist1_OBGYN_ALL_OB_FT:[id number]=],[NS_DeliveryRN_OBGYN_ALL_OB_FT:[id number]],[NS_CirculateRN2_OBGYN_ALL_OB_FT:[id number]=]

## 2023-04-06 NOTE — CONSULT NOTE ADULT - ATTENDING COMMENTS
Patient was sen and assessed at the bedside around 9:30 -10:00 AM.  She was noted to be in early labor based on her progressive cervical dilatation over night.   The question from Dr. Reinoso was if patient can be admitted for delivery.  I performed a bedside US and noted that the fetus was in cephalic presentation and the anterior fibroid was not obstructing.   Polyhydramnios was again noted.  NST was category I and she just received epidural analgesia.   I informed Dr. Reinoso at the bedside that there is no contraindication to augmentation.  Precautions in case of a  section about the anterior myoma.  If AROM, ensure head is well applied and be careful and watchful for cord prolapse.  2 units should be crossed in case needed.

## 2023-04-06 NOTE — OB PROVIDER H&P - NS_OBGYNHISTORY_OBGYN_ALL_OB_FT
ObHx:   h/o 1 ectopic pregnancy, treated with methotrexate  h/o 4 SAB, no D+C    GynHx: 13cm fibroid on sonos. Denies h/o ovarian cysts, abnormal PAPs, STIs. ObHx:   h/o 1 ectopic pregnancy, treated with methotrexate  h/o 5 SAB, no D+C    GynHx: 13cm fibroid on sonos. Denies h/o ovarian cysts, abnormal PAPs, STIs.

## 2023-04-06 NOTE — OB PROVIDER H&P - CURRENT PREGNANCY COMPLICATIONS, OB PROFILE
None large fibroid uterus, short cervix/premature cervical dilation/Incompetent Cervix/Cervical Insufficiency/Polyhydramnios/ Labor

## 2023-04-06 NOTE — BRIEF OPERATIVE NOTE - OPERATION/FINDINGS
female infant in vtx positive, apgars 9/9, 13cm anterior fibroid, normal tubes and ovaries bilaterally female infant in vtx occiput posterior position, apgars 9/9, 13cm anterior fibroid, normal tubes and ovaries bilaterally, pelvic adhesions lysed without complication

## 2023-04-06 NOTE — BRIEF OPERATIVE NOTE - COMMENTS
uterus closed in single layer due to minimal tissue anterior incision due to large 13cm fibroid encompassing most of the anterior uterine wall.  Interceed placed over anterior surface for adhesion prevention.  adhesions lysed from omentum/adnexa/uterus/sidewall.

## 2023-04-06 NOTE — BRIEF OPERATIVE NOTE - NSICDXBRIEFPREOP_GEN_ALL_CORE_FT
PRE-OP DIAGNOSIS:   labor 2023 23:13:15  Krystle Espinoza  Fetal distress affecting delivery 2023 23:13:01  Krystle Espinoza

## 2023-04-06 NOTE — BRIEF OPERATIVE NOTE - NSICDXBRIEFPOSTOP_GEN_ALL_CORE_FT
POST-OP DIAGNOSIS:  Fetal distress affecting delivery 2023 23:13:23  Krystle Espinoza   labor 2023 23:13:20  Krystle Espinoza

## 2023-04-06 NOTE — PROGRESS NOTE ADULT - SUBJECTIVE AND OBJECTIVE BOX
Epidural and baxter catheter placed and bedside sonogram done by Dr. Beltran RICHARD to evaluate the polyhydramnios and fetal position and fibroid uterus.  The fetal head was again noted to be below the large LLQ fibroid and M was in agreement that vaginal delivery should be attempted especially because of risks of surgery and the patients requests/goal.  There does not appear to be any obstruction or hindrance at present.  BPP 8/8 with polyhydramnios still noted.  Placenta posterior.

## 2023-04-06 NOTE — OB PROVIDER H&P - HISTORY OF PRESENT ILLNESS
36yo  at 36.2 weeks by LMP, SUKHDEEP 23, presented to L&D for contractions. Contractions began last night at 2200, q 5 minutes, 8/10 in intensity. Patient is s/p course of celestone 3/15-3/17. Denies leakage of fluid and vaginal bleeding. Pregnancy complicated by dynamic cervical changes with mild funneling, on vaginal progesterone since 16wk GA. Patient is consanguineous with partner, NIPT and AFP low risk. History of recurrent pregnancy loss, APLS workup negative during this pregnancy. Patient previously underwent IUI and IVF for pregnancy however this pregnancy was spontaneous. GBS negative.

## 2023-04-06 NOTE — CONSULT NOTE ADULT - SUBJECTIVE AND OBJECTIVE BOX
MFM Consult    TRIAGE/ADMISSIONI HPI:  HPI:  36yo  at 36.2 weeks by LMP, SUKHDEEP 23, presented to L&D for contractions. Contractions began last night at 2200, q 5 minutes, 8/10 in intensity. Patient is s/p course of celestone 3/15-3/17. Denies leakage of fluid and vaginal bleeding. Pregnancy complicated by dynamic cervical changes with mild funneling, on vaginal progesterone since 16wk GA. Patient is consanguineous with partner, NIPT and AFP low risk. History of recurrent pregnancy loss, APLS workup negative during this pregnancy. Patient previously underwent IUI and IVF for pregnancy however this pregnancy was spontaneous. GBS negative.  (2023 07:21)      23 SUBJECTIVE  Ms. RACHEL QUEZADA is a 37y  @36w3d who presented with painful contractions. She had serial cervical exams with progressive change to 5cm dilation. Currently reports contractions every 5-6 minutes, rated 4-5 out of 10 in intensity. Denies vaginal bleeding or leakage of fluid. Reports good fetal movement. Denies fever, chills, nausea, vomiting.     PAST MEDICAL & SURGICAL HISTORY:  Fibroid uterus  No significant past surgical history    ObHx:   h/o 1 ectopic pregnancy, treated with methotrexate  h/o 4 SAB, no D+C    GynHx: 13cm fibroid on sonos. Denies h/o ovarian cysts, abnormal PAPs, STIs.    Allergies:  No Known Allergies      MEDICATIONS  (STANDING):  lactated ringers. 1000 milliLiter(s) (125 mL/Hr) IV Continuous <Continuous>  lactated ringers. 1000 milliLiter(s) (125 mL/Hr) IV Continuous <Continuous>  oxytocin Infusion 333.333 milliUNIT(s)/Min (1000 mL/Hr) IV Continuous <Continuous>      FAMILY HISTORY:  FH: type 2 diabetes (Mother)  FH: hypertension (Mother)  FH: breast cancer (Mother, Grandparent, Aunt), all diagnosed in their 40s.     Social history:  No alcohol use, drug use, or smoking.     Review of systems:  A complete review of systems was obtained and is negative except as described in the HPI.    General:  In no apparent distress  T(F): 97.9 (23 @ 07:39), Max: 98.06 (23 @ 14:04)  HR: 67 (23 @ 07:39) (56 - 102)  BP: 114/75 (23 @ 07:39) (88/52 - 116/64)  RR: 18 (23 @ 07:39) (17 - 20)  SpO2: 99% (23 @ 11:58) (90% - 100%)  I&O's Summary    HEENT:  Atraumatic.  Extraocular muscles intact.  CV:  Normal S1 S2.  No murmurs.  Pulmonary:  Clear to asucultation bilaterally.  No wheezing.  Abdomen:  Soft, nontender, nondistended, no rebound, no guarding.  Musculoskeletal:  No calf tenderness  Neurology:  Deep tendon reflexes 2+ bilaterally.  Pshy:  Awake, alert, oriented to person, place, time.  VE (resident exam): deferred at this time    History of cervical exam checks:  23   @0722 3/50/-3  @1125 3.550/-3  @1345 4.5/80/-3  23  Last exam @655 (by ): 5.0/80/-3/vtx/intact     EFM: 125/mod napoleon/+accels   [place]: q2-4min     LABORATORY:                        11.7   6.68  )-----------( 200      ( 2023 07:45 )             36.2             Urinalysis Basic - ( 2023 18:33 )    Color: Colorless / Appearance: Clear / S.005 / pH: x  Gluc: x / Ketone: Negative  / Bili: Negative / Urobili: <2 mg/dL   Blood: x / Protein: Negative / Nitrite: Negative   Leuk Esterase: Small / RBC: 1 /HPF / WBC 1 /HPF   Sq Epi: x / Non Sq Epi: x / Bacteria: Negative    Date: 23  vaginitis neg  23  B neg, neg antibody screen  23  GCT 99  22  GBS neg  11/15/22  AFP wnl  10/4/22  NIPT low risk  22  A1c 5.2  HepBSAg NR  HCV NR  HIV NR  TrepAb neg  rubella immune  measles immune  varicella immune  3/15/23 GBS neg    IMAGINw4d: vertex, posterior, MVP 8.13cm, BPP /10 (nonreactive NST)  32w4d: vertex, posterior placenta, NST 10/10, EFW 2126gm (58%ile), left lateral subserosal fibroid 12.23x10.47x8.7 cm  27w4d: cervix 1.9cm  25w4d: cervix 3.35cm  24w2d: cervix 4.13cm  21w3d: incomplete anatomy scan with no gross abnormalities  12w1d: NT wnl

## 2023-04-06 NOTE — OB PROVIDER DELIVERY SUMMARY - NSMODPPHRISK_OBGYN_A_OB
Large Uterine fibroids/AMA - over 35 years of age Over-distended uterus: Multiple gestation, polyhydramnios, Macrosomia/Large Uterine fibroids/AMA - over 35 years of age

## 2023-04-06 NOTE — OB PROVIDER H&P - NSHPPHYSICALEXAM_GEN_ALL_CORE
T(C): 36.6 (04-06-23 @ 07:39), Max: 36.7 (04-05-23 @ 14:04)  HR: 100 (04-06-23 @ 10:13) (56 - 102)  BP: 123/58 (04-06-23 @ 10:02) (88/52 - 125/58)  RR: 18 (04-06-23 @ 07:39) (17 - 20)  SpO2: 100% (04-06-23 @ 10:13) (90% - 100%)    EFM: 130 bpm, moderate variability, +accels  TOCO: q 5 mins    Abd: soft, gravid, nontender

## 2023-04-06 NOTE — OB PROVIDER H&P - ASSESSMENT
37y  @ 36.2 weeks, GBS negative, h/o recurrent miscarriages, s/p celestone, in labor.    Admit to L & D  IV hydration, labs  Continuous EFM & TOCO monitoring  Clear Liquid diet  Pain Management PRN    Dr. Reinoso aware

## 2023-04-06 NOTE — PROGRESS NOTE ADULT - ASSESSMENT
37y  @36w3d who presented with painful contractions, GBS neg (3/15/23), in early labor, no contraindication for labor augmentation     - admit to L&D  - clear liquid diet  - IVF hydration  - continuous ECM/toco  - monitor vitals  - pain management prn, patient requesting epidural  - f/u admission labs and covid swab     aware

## 2023-04-06 NOTE — PROGRESS NOTE ADULT - SUBJECTIVE AND OBJECTIVE BOX
PGY 1 Note    Patient seen at bedside. Reports overnight have some contractions, 8/10 pain. Also reports some brown spotting on toilet paper after wiping. Denies heavy or bright red vaginal bleeding. Reassured patient that this is normal and likely 2/2 to SVE yesterday. Reports +FM.     T(F): 97.7 (04:30)  HR: 60 (01:17)  BP: 116/58 (01:17)  RR: 19 (04:30)    EFM: 120/mod/+accels  TOCO: q6m  SVE: deferred, PMD Dr. Reinoso to round and perform SVE    Labs:                        11.7   6.68  )-----------( 200      ( 2023 07:45 )             36.2           ABO RH Interpretation: B NEG (23 @ 08:08)    Urinalysis Basic - ( 2023 18:33 )    Color: Colorless / Appearance: Clear / S.005 / pH: x  Gluc: x / Ketone: Negative  / Bili: Negative / Urobili: <2 mg/dL   Blood: x / Protein: Negative / Nitrite: Negative   Leuk Esterase: Small / RBC: 1 /HPF / WBC 1 /HPF   Sq Epi: x / Non Sq Epi: x / Bacteria: Negative      Meds: lactated ringers. 1000 milliLiter(s) IV Continuous <Continuous>  lactated ringers. 1000 milliLiter(s) IV Continuous <Continuous>

## 2023-04-06 NOTE — PROGRESS NOTE ADULT - SUBJECTIVE AND OBJECTIVE BOX
PT resting comfortably with epidural.  Denies all complaints of CTX, VB, LOF, vag bleeding.      Vital Signs Last 24 Hrs  T(F): 97.9 (06 Apr 2023 07:39), Max: 98.06 (06 Apr 2023 01:30)  HR: 63 (06 Apr 2023 15:38) (51 - 100)  BP: 99/56 (06 Apr 2023 15:31) (88/52 - 130/78)  RR: 18 (06 Apr 2023 07:39) (17 - 20)  SpO2: 98% (06 Apr 2023 15:38) (89% - 100%)    VE: 5-6/80/-3  TOCO: no contractions seen  FHR: 120s Category 1

## 2023-04-06 NOTE — CONSULT NOTE ADULT - ASSESSMENT
37y  @36w3d who presented with painful contractions, GBS neg (3/15/23), fibroid uterus, admitted for early labor, no contraindication for labor augmentation     - admit to L&D  - clear liquid diet  - IVF hydration  - continuous ECM/toco  - monitor vitals  - pain management prn, patient requesting epidural     aware        37y  @36w3d who presented with painful contractions, GBS neg (3/15/23), fibroid uterus, admitted for early labor  No contraindication for labor augmentation     -    aware

## 2023-04-06 NOTE — OB PROVIDER H&P - ATTENDING COMMENTS
38yo  @ 36 3/7wks admitted for early labor.  She was observed overnight for evaluation for  labor and she started having increased pain and increased contraction frequency and at around 3am she stated the pain was 4/10 and had bloody show/mucous plug noted on wiping.  She asked for pain medication and was given IV hydration and IV tylenol.  When I arrived to assess her again she was noted to have more regular CTXs q 5 minutes and SVE 5/80/-3 bulging membranes.      FHT: 120s, moderate variability, + accelerations, no decelerations, CTXs q 2-5 minutes    sonogram done again and fetal head still noted to be presenting part below large left anterior fibroid.  She has been counseled on numerous occasions regarding the concern of position and size of the largest fibroid and possibility of dystocia of heavy bleeding with vaginal delivery.  We also discussed the possibility if need for  section the risk of hemorrhage, classical incision and hysterectomy.  She understands all potential risks and wants to attempt vaginal delivery unless emergency C/S is needed. Patient requesting epidural pain mgmt.    I discussed the patient with Dr. Beltran RICHARD regarding early  labor like presentation with very slow dilation with now increased pain and frequency of CTXs and risks of prematurity.  She has had steroid course a couple weeks ago due to concern for possible PTD and premature cervical dilation.  After discussion he felt that the patient should be delivered.  Pediatricians have been made aware regarding plan for delivery.    A: 38yo  @ 36+ wks in  labor, polyhydramnios, AMA, large fibroid uterus    P: admit      NPO      IV hydration      anesthesia consulted for epidural pain mgmt      continuous fetal monitoring      venodyne compression      will reassess as clinically indicated

## 2023-04-06 NOTE — OB PROVIDER H&P - ALERT: PERTINENT HISTORY
20 Week Level II Sonogram/BioPhysical Profile(s)/Fetal Non-Stress Test (NST)/Ultra Screen at 12 Weeks

## 2023-04-06 NOTE — PROGRESS NOTE ADULT - SUBJECTIVE AND OBJECTIVE BOX
patient was reassess shortly after pitocin augmentation was started because she was noted to have recurrent late decelerations with minimal variability.  CTXs were q 3 min at that time.  She was left lateral positioning and given oxygen.  The FHT did not improve so the pitocin was discontinued patient was repositioned and continued oxygen.  FS was 90 ( hasn't eaten since yesterday) and will start D5LR fluids.  After resuscitation measures and  discontinuation of pitocin the FHT converted back to category 1 tracing.    FHT: 120s, moderate variability no accelerations, no decelerations, CTXs q 7 minutes    A: IUP @ 36+ wks,  labor, polyhydramnios, large fibroid uterus, AMA    P: continue to monitor closely      IV hydration      epidural in place      continuous fetal monitoring      venodyne compression      will reassess as clinically indicated

## 2023-04-06 NOTE — CHART NOTE - NSCHARTNOTEFT_GEN_A_CORE
PACU ANESTHESIA ADMISSION NOTE      Procedure:   Post op diagnosis:      ____  Intubated  TV:______       Rate: ______      FiO2: ______    __x__  Patent Airway    _x___  Full return of protective reflexes  x  _x___  Full recovery from anesthesia / back to baseline     Vitals:   T:98           R: 18                 /67:                  Sat:100                   P: 87      Mental Status:  _x___ Awake  x _____ Alert   _____ Drowsy   _____ Sedated    Nausea/Vomiting:  ____ NO  ______Yes,   See Post - Op Orders          Pain Scale (0-10):  _____    Treatment: ____ None    ____ See Post - Op/PCA Orders    Post - Operative Fluids:   ____ Oral   ____ See Post - Op Orders    Plan: Discharge:   ____Home     x  _____Floor     _____Critical Care    _____  Other:_________________    Comments:

## 2023-04-06 NOTE — PROGRESS NOTE ADULT - SUBJECTIVE AND OBJECTIVE BOX
patient examined for progression of labor, she is still comfortable with epidural.  She as previously examined by another attending when I was not in house to evaluate and she stated there was no change in exam.  Due to patients multiple complications of advanced dilation, ballotable head/polyhydramnios, large fibroid uterus and inability earlier to tolerate pitocin, they did not feel comfortable continuing any active mgmt until I was available.  I again spoke with MFM regarding her mgmt and to see if delivery was still the only recommendation and if possibly continued monitoring was an option due to prematurity and no further change in cervical exam.  MFM felt that she should still be delivered, although the options to proceed are all not without risk.      SVE: 680/-3 bulging, slightly ballotable with upward manipulation, but head applied to membranes without manipulation    FHT: 120s, moderate variability, + accelerations, no decelerations, rare/irregular CTXs    I had a long and extensive conversation with the patient regarding the options of restarting pitocin and evaluating tolerance or trying to needle the membranes by AROM in a controlled setting in the OR to see if that will naturally augment labor and help with progression.  We discussed the possibility of unintentional full AROM and cord prolapse.  We discussed regardless the decision the risks of emergency  section was possible and will prepare accordingly.   We also discussed the risks  section including need for classical  due to placement of fibroid and need for repeat  section and risks in the future, hemorrhage, blood transfusion, hysterectomy and inability to carry another baby but could have her embryos carried by a surrogate, bowel/bladder injury and repair, infection, need for future surgery and readmission.  She understood all risks and is in agreement with mgmt plan.  She will signed informed, witnessed consent for C/S if necessary.     A: 38yo  @ 36+ wks in  labor, polyhydramnios, large fibroid uterus, AMA    P: continue to monitor closely      IV hydration      epidural in place       anesthesia consulted on plan      continuous fetal monitoring       venodyne compression       on call to RO for attempt of controlled AROM

## 2023-04-06 NOTE — PROGRESS NOTE ADULT - ASSESSMENT
37y  @ 36.3 weeks, GBS negative, r/o labor following overnight monitoring  -cont EFM/toco  -clear liquid diet, IVF  -pain management prn  -f/u Ucx, GBS  -f/u SVE per PMD to determine if labor    Dr. Espinoza aware, Dr. Reinoso to be aware

## 2023-04-06 NOTE — PROGRESS NOTE ADULT - SUBJECTIVE AND OBJECTIVE BOX
MFM Consult    TRIAGE/ADMISSIONI HPI:  HPI:  38yo  at 36.2 weeks by LMP, SUKHDEEP 23, presented to L&D for contractions. Contractions began last night at 2200, q 5 minutes, 8/10 in intensity. Patient is s/p course of celestone 3/15-3/17. Denies leakage of fluid and vaginal bleeding. Pregnancy complicated by dynamic cervical changes with mild funneling, on vaginal progesterone since 16wk GA. Patient is consanguineous with partner, NIPT and AFP low risk. History of recurrent pregnancy loss, APLS workup negative during this pregnancy. Patient previously underwent IUI and IVF for pregnancy however this pregnancy was spontaneous. GBS negative.  (2023 07:21)      23 SUBJECTIVE  Ms. RACHEL QUEZADA is a 37y  @36w3d who presented with painful contractions. She had serial cervical exams with progressive change to 5cm dilation. Currently reports contractions every 5-6 minutes, rated 4-5 out of 10 in intensity. Denies vaginal bleeding or leakage of fluid. Reports good fetal movement. Denies fever, chills, nausea, vomiting.     PAST MEDICAL & SURGICAL HISTORY:  Fibroid uterus  No significant past surgical history    ObHx:   h/o 1 ectopic pregnancy, treated with methotrexate  h/o 4 SAB, no D+C    GynHx: 13cm fibroid on sonos. Denies h/o ovarian cysts, abnormal PAPs, STIs.    Allergies:  No Known Allergies      MEDICATIONS  (STANDING):  lactated ringers. 1000 milliLiter(s) (125 mL/Hr) IV Continuous <Continuous>  lactated ringers. 1000 milliLiter(s) (125 mL/Hr) IV Continuous <Continuous>  oxytocin Infusion 333.333 milliUNIT(s)/Min (1000 mL/Hr) IV Continuous <Continuous>      FAMILY HISTORY:  FH: type 2 diabetes (Mother)  FH: hypertension (Mother)  FH: breast cancer (Mother, Grandparent, Aunt), all diagnosed in their 40s.     Social history:  No alcohol use, drug use, or smoking.     Review of systems:  A complete review of systems was obtained and is negative except as described in the HPI.    General:  In no apparent distress  T(F): 97.9 (23 @ 07:39), Max: 98.06 (23 @ 14:04)  HR: 67 (23 @ 07:39) (56 - 102)  BP: 114/75 (23 @ 07:39) (88/52 - 116/64)  RR: 18 (23 @ 07:39) (17 - 20)  SpO2: 99% (23 @ 11:58) (90% - 100%)  I&O's Summary    HEENT:  Atraumatic.  Extraocular muscles intact.  CV:  Normal S1 S2.  No murmurs.  Pulmonary:  Clear to asucultation bilaterally.  No wheezing.  Abdomen:  Soft, nontender, nondistended, no rebound, no guarding.  Musculoskeletal:  No calf tenderness  Neurology:  Deep tendon reflexes 2+ bilaterally.  Pshy:  Awake, alert, oriented to person, place, time.  VE (resident exam): deferred at this time    Last exam @0655 (by ): 5.0/80/-3/vtx/intact   EFM: 125/mod [place]/+accels   [place]: q2-4min     LABORATORY:                        11.7   6.68  )-----------( 200      ( 2023 07:45 )             36.2             Urinalysis Basic - ( 2023 18:33 )    Color: Colorless / Appearance: Clear / S.005 / pH: x  Gluc: x / Ketone: Negative  / Bili: Negative / Urobili: <2 mg/dL   Blood: x / Protein: Negative / Nitrite: Negative   Leuk Esterase: Small / RBC: 1 /HPF / WBC 1 /HPF   Sq Epi: x / Non Sq Epi: x / Bacteria: Negative    Date: 23  vaginitis neg  23  B neg, neg antibody screen  23  GCT 99  22  GBS neg  11/15/22  AFP wnl  10/4/22  NIPT low risk  22  A1c 5.2  HepBSAg NR  HCV NR  HIV NR  TrepAb neg  rubella immune  measles immune  varicella immune    IMAGINw4d: vertex, posterior, MVP 8.13cm, BPP 8/10 (nonreactive NST)  32w4d: vertex, posterior placenta, NST 10/10, EFW 2126gm (58%ile), left lateral subserosal fibroid 12.23x10.47x8.7 cm  27w4d: cervix 1.9cm  25w4d: cervix 3.35cm  24w2d: cervix 4.13cm  21w3d: incomplete anatomy scan with no gross abnormalities  12w1d: NT wnl

## 2023-04-06 NOTE — PROCEDURE NOTE - ADDITIONAL PROCEDURE DETAILS
Epidural Note. Patient sitting. Lumbar epidural performed at L3-4. Pulse oximetry, NIBP, FHRM in place. Patient sitting. Sterile gloves, Chloro-prep. 1% lidocaine for local infiltration. JUDE to Air 5cm. 27g spinal needle inserted. + CSF. Denies paresthesia. Spinal needle removed. Flexitip Catheter threaded easily to 20cm. 17g Touhy needle removed. Epidural cathater pulled back and secured in place at 10  cm. Negative aspiration for CSF and blood. Test dose consisting of 3ml 1.5% lidocaine with epinephrine was negative. Remaining 2ml of test dose consisting of 1.5% lidocaine with epinephrine. Patient tolerated procedure and was hemodynamically stable throughout. 10 cc .125% bupivacaine epidural.  T10 level bilaterally. Uncomplicated procedure. Covering attending physician aware. Vitals per nursing epidural protocol.     Post Procedure patient evaluated at bedside.  Hemodynamically stable, degree of motor block appropriate for epidural medication administered. Pain is well controlled bilaterally. Patient is aware that the anesthesia team is available 24/7, in case she needs more pain medication or has any other anesthesia-related needs or questions.     .0625% Bupivacaine +2ucg/cc Fentanyl epidural PIEB Intermittent Bolus 10ml, Bolous interval 45 min, Next bolus 30 min. PCEA 8ml, PCEA Lockout 10 min, 1 hour limit 35ml Epidural Note. Patient sitting. Lumbar epidural performed at L3-4. Pulse oximetry, NIBP, FHRM in place. Patient sitting. Sterile gloves, Chloro-prep. 1% lidocaine for local infiltration. JUDE to Air 5cm. 27g spinal needle inserted. + CSF. Denies paresthesia. Spinal needle removed. Flexitip Catheter threaded easily to 20cm. 17g Touhy needle removed. Epidural cathater pulled back and secured in place at 10  cm. Negative aspiration for CSF and blood. Test dose consisting of 3ml 1.5% lidocaine with epinephrine was negative. Remaining 2ml of test dose consisting of 1.5% lidocaine with epinephrine. Patient tolerated procedure and was hemodynamically stable throughout. 10 cc .125% bupivacaine epidural.  T10 level bilaterally. Uncomplicated procedure. Covering attending physician aware. Vitals per nursing epidural protocol.     Post Procedure patient evaluated at bedside.  Hemodynamically stable, degree of motor block appropriate for epidural medication administered. Pain is well controlled bilaterally. Patient is aware that the anesthesia team is available 24/7, in case she needs more pain medication or has any other anesthesia-related needs or questions.     .0625% Bupivacaine +2ucg/cc Fentanyl epidural PIEB Intermittent Bolus 10ml, Bolous interval 45 min, Next bolus 30 min. PCEA 8ml, PCEA Lockout 10 min, 1 hour limit 35ml      patient went to OR @21:20 FOR stat c /s

## 2023-04-07 ENCOUNTER — APPOINTMENT (OUTPATIENT)
Dept: ANTEPARTUM | Facility: CLINIC | Age: 38
End: 2023-04-07

## 2023-04-07 LAB
ALBUMIN SERPL ELPH-MCNC: 2.8 G/DL — LOW (ref 3.5–5.2)
ALP SERPL-CCNC: 115 U/L — SIGNIFICANT CHANGE UP (ref 30–115)
ALT FLD-CCNC: 7 U/L — SIGNIFICANT CHANGE UP (ref 0–41)
ANION GAP SERPL CALC-SCNC: 13 MMOL/L — SIGNIFICANT CHANGE UP (ref 7–14)
APPEARANCE UR: CLEAR — SIGNIFICANT CHANGE UP
AST SERPL-CCNC: 24 U/L — SIGNIFICANT CHANGE UP (ref 0–41)
BACTERIA # UR AUTO: NEGATIVE — SIGNIFICANT CHANGE UP
BASOPHILS # BLD AUTO: 0.01 K/UL — SIGNIFICANT CHANGE UP (ref 0–0.2)
BASOPHILS NFR BLD AUTO: 0.1 % — SIGNIFICANT CHANGE UP (ref 0–1)
BILIRUB SERPL-MCNC: 0.2 MG/DL — SIGNIFICANT CHANGE UP (ref 0.2–1.2)
BILIRUB UR-MCNC: NEGATIVE — SIGNIFICANT CHANGE UP
BUN SERPL-MCNC: <3 MG/DL — LOW (ref 10–20)
CALCIUM SERPL-MCNC: 8.3 MG/DL — LOW (ref 8.4–10.5)
CHLORIDE SERPL-SCNC: 105 MMOL/L — SIGNIFICANT CHANGE UP (ref 98–110)
CO2 SERPL-SCNC: 17 MMOL/L — SIGNIFICANT CHANGE UP (ref 17–32)
COLOR SPEC: COLORLESS — SIGNIFICANT CHANGE UP
CREAT SERPL-MCNC: <0.5 MG/DL — LOW (ref 0.7–1.5)
CULTURE RESULTS: SIGNIFICANT CHANGE UP
DIFF PNL FLD: ABNORMAL
EGFR: 131 ML/MIN/1.73M2 — SIGNIFICANT CHANGE UP
EOSINOPHIL # BLD AUTO: 0.01 K/UL — SIGNIFICANT CHANGE UP (ref 0–0.7)
EOSINOPHIL NFR BLD AUTO: 0.1 % — SIGNIFICANT CHANGE UP (ref 0–8)
EPI CELLS # UR: 1 /HPF — SIGNIFICANT CHANGE UP (ref 0–5)
FETAL SCREEN: SIGNIFICANT CHANGE UP
GLUCOSE SERPL-MCNC: 110 MG/DL — HIGH (ref 70–99)
GLUCOSE UR QL: NEGATIVE — SIGNIFICANT CHANGE UP
GROUP B BETA STREP DNA (PCR): SIGNIFICANT CHANGE UP
HCT VFR BLD CALC: 29.9 % — LOW (ref 37–47)
HGB BLD-MCNC: 9.5 G/DL — LOW (ref 12–16)
HYALINE CASTS # UR AUTO: 0 /LPF — SIGNIFICANT CHANGE UP (ref 0–7)
IMM GRANULOCYTES NFR BLD AUTO: 0.4 % — HIGH (ref 0.1–0.3)
KETONES UR-MCNC: NEGATIVE — SIGNIFICANT CHANGE UP
LEUKOCYTE ESTERASE UR-ACNC: NEGATIVE — SIGNIFICANT CHANGE UP
LYMPHOCYTES # BLD AUTO: 2.21 K/UL — SIGNIFICANT CHANGE UP (ref 1.2–3.4)
LYMPHOCYTES # BLD AUTO: 24 % — SIGNIFICANT CHANGE UP (ref 20.5–51.1)
MCHC RBC-ENTMCNC: 27.9 PG — SIGNIFICANT CHANGE UP (ref 27–31)
MCHC RBC-ENTMCNC: 31.8 G/DL — LOW (ref 32–37)
MCV RBC AUTO: 87.9 FL — SIGNIFICANT CHANGE UP (ref 81–99)
MONOCYTES # BLD AUTO: 0.33 K/UL — SIGNIFICANT CHANGE UP (ref 0.1–0.6)
MONOCYTES NFR BLD AUTO: 3.6 % — SIGNIFICANT CHANGE UP (ref 1.7–9.3)
NEUTROPHILS # BLD AUTO: 6.6 K/UL — HIGH (ref 1.4–6.5)
NEUTROPHILS NFR BLD AUTO: 71.8 % — SIGNIFICANT CHANGE UP (ref 42.2–75.2)
NITRITE UR-MCNC: NEGATIVE — SIGNIFICANT CHANGE UP
NRBC # BLD: 0 /100 WBCS — SIGNIFICANT CHANGE UP (ref 0–0)
PH UR: 6.5 — SIGNIFICANT CHANGE UP (ref 5–8)
PLATELET # BLD AUTO: 179 K/UL — SIGNIFICANT CHANGE UP (ref 130–400)
POTASSIUM SERPL-MCNC: 3.9 MMOL/L — SIGNIFICANT CHANGE UP (ref 3.5–5)
POTASSIUM SERPL-SCNC: 3.9 MMOL/L — SIGNIFICANT CHANGE UP (ref 3.5–5)
PROT SERPL-MCNC: 5.1 G/DL — LOW (ref 6–8)
PROT UR-MCNC: NEGATIVE — SIGNIFICANT CHANGE UP
RBC # BLD: 3.4 M/UL — LOW (ref 4.2–5.4)
RBC # FLD: 14.8 % — HIGH (ref 11.5–14.5)
RBC CASTS # UR COMP ASSIST: 410 /HPF — HIGH (ref 0–4)
SODIUM SERPL-SCNC: 135 MMOL/L — SIGNIFICANT CHANGE UP (ref 135–146)
SOURCE GROUP B STREP: SIGNIFICANT CHANGE UP
SP GR SPEC: 1.01 — SIGNIFICANT CHANGE UP (ref 1.01–1.03)
SPECIMEN SOURCE: SIGNIFICANT CHANGE UP
UROBILINOGEN FLD QL: SIGNIFICANT CHANGE UP
WBC # BLD: 9.2 K/UL — SIGNIFICANT CHANGE UP (ref 4.8–10.8)
WBC # FLD AUTO: 9.2 K/UL — SIGNIFICANT CHANGE UP (ref 4.8–10.8)
WBC UR QL: 3 /HPF — SIGNIFICANT CHANGE UP (ref 0–5)

## 2023-04-07 RX ORDER — OXYCODONE HYDROCHLORIDE 5 MG/1
5 TABLET ORAL ONCE
Refills: 0 | Status: DISCONTINUED | OUTPATIENT
Start: 2023-04-07 | End: 2023-04-07

## 2023-04-07 RX ORDER — OXYCODONE HYDROCHLORIDE 5 MG/1
5 TABLET ORAL ONCE
Refills: 0 | Status: DISCONTINUED | OUTPATIENT
Start: 2023-04-07 | End: 2023-04-08

## 2023-04-07 RX ORDER — IRON SUCROSE 20 MG/ML
200 INJECTION, SOLUTION INTRAVENOUS ONCE
Refills: 0 | Status: COMPLETED | OUTPATIENT
Start: 2023-04-07 | End: 2023-04-07

## 2023-04-07 RX ORDER — IBUPROFEN 200 MG
600 TABLET ORAL EVERY 6 HOURS
Refills: 0 | Status: DISCONTINUED | OUTPATIENT
Start: 2023-04-07 | End: 2023-04-09

## 2023-04-07 RX ADMIN — Medication 200 GRAM(S): at 11:10

## 2023-04-07 RX ADMIN — ENOXAPARIN SODIUM 40 MILLIGRAM(S): 100 INJECTION SUBCUTANEOUS at 06:40

## 2023-04-07 RX ADMIN — Medication 30 MILLIGRAM(S): at 06:36

## 2023-04-07 RX ADMIN — Medication 30 MILLIGRAM(S): at 11:09

## 2023-04-07 RX ADMIN — Medication 30 MILLIGRAM(S): at 18:09

## 2023-04-07 RX ADMIN — Medication 975 MILLIGRAM(S): at 09:00

## 2023-04-07 RX ADMIN — SIMETHICONE 80 MILLIGRAM(S): 80 TABLET, CHEWABLE ORAL at 11:09

## 2023-04-07 RX ADMIN — Medication 1000 MILLIGRAM(S): at 00:13

## 2023-04-07 RX ADMIN — Medication 200 GRAM(S): at 17:43

## 2023-04-07 RX ADMIN — Medication 975 MILLIGRAM(S): at 22:11

## 2023-04-07 RX ADMIN — Medication 104 MILLIGRAM(S): at 13:31

## 2023-04-07 RX ADMIN — Medication 200 GRAM(S): at 06:57

## 2023-04-07 RX ADMIN — Medication 975 MILLIGRAM(S): at 04:45

## 2023-04-07 RX ADMIN — Medication 104 MILLIGRAM(S): at 08:41

## 2023-04-07 RX ADMIN — MORPHINE SULFATE 4 MILLIGRAM(S): 50 CAPSULE, EXTENDED RELEASE ORAL at 00:13

## 2023-04-07 RX ADMIN — Medication 30 MILLIGRAM(S): at 12:00

## 2023-04-07 RX ADMIN — SIMETHICONE 80 MILLIGRAM(S): 80 TABLET, CHEWABLE ORAL at 06:36

## 2023-04-07 RX ADMIN — SIMETHICONE 80 MILLIGRAM(S): 80 TABLET, CHEWABLE ORAL at 17:43

## 2023-04-07 RX ADMIN — Medication 30 MILLIGRAM(S): at 17:43

## 2023-04-07 RX ADMIN — Medication 100 MILLIGRAM(S): at 21:10

## 2023-04-07 RX ADMIN — OXYCODONE HYDROCHLORIDE 5 MILLIGRAM(S): 5 TABLET ORAL at 15:53

## 2023-04-07 RX ADMIN — Medication 975 MILLIGRAM(S): at 21:11

## 2023-04-07 RX ADMIN — Medication 100 MILLIGRAM(S): at 06:41

## 2023-04-07 RX ADMIN — Medication 975 MILLIGRAM(S): at 15:00

## 2023-04-07 RX ADMIN — SIMETHICONE 80 MILLIGRAM(S): 80 TABLET, CHEWABLE ORAL at 23:26

## 2023-04-07 RX ADMIN — Medication 975 MILLIGRAM(S): at 14:34

## 2023-04-07 RX ADMIN — IRON SUCROSE 110 MILLIGRAM(S): 20 INJECTION, SOLUTION INTRAVENOUS at 15:14

## 2023-04-07 RX ADMIN — Medication 975 MILLIGRAM(S): at 03:45

## 2023-04-07 RX ADMIN — Medication 100 MILLIGRAM(S): at 13:30

## 2023-04-07 RX ADMIN — Medication 30 MILLIGRAM(S): at 01:05

## 2023-04-07 RX ADMIN — Medication 30 MILLIGRAM(S): at 01:13

## 2023-04-07 RX ADMIN — Medication 600 MILLIGRAM(S): at 23:26

## 2023-04-07 RX ADMIN — Medication 975 MILLIGRAM(S): at 08:15

## 2023-04-07 RX ADMIN — SENNA PLUS 2 TABLET(S): 8.6 TABLET ORAL at 21:13

## 2023-04-07 NOTE — PROGRESS NOTE ADULT - ASSESSMENT
A/P: 38yo P1 s/p emergent primary LTCS for fetal bradycardia, POD#1, rh neg, recovering well   - continue routine post op care  - encourage ambulation, PO hydration  - monitor vitals, bleeding   - simethicone/senna  - DVT prophylaxis: lovenox + scds  - discontinue baxter catheter, TOV 6hrs after removal  - pain mgmt prn   - f/u AM CBC and CMP, fetal screen Ucx  - rhogam ordered    Dr. Reinoso to be made aware   A/P: 36yo P1 s/p emergent primary LTCS for fetal bradycardia, POD#1, rh neg, recovering well   - continue routine post op care  - Continue antibiotics for 24hrs  - encourage ambulation, PO hydration  - monitor vitals, bleeding   - simethicone/senna  - DVT prophylaxis: lovenox + scds  - discontinue baxter catheter, TOV 6hrs after removal  - pain mgmt prn   - f/u AM CBC and CMP, fetal screen Ucx  - rhogam ordered    Dr. Reinoso to be made aware

## 2023-04-07 NOTE — PROGRESS NOTE ADULT - SUBJECTIVE AND OBJECTIVE BOX
RACHEL QUEZADA  37y  Female    PGY3 Note:    Pt is POD#1. Pt is recovering well, no acute complaints. Pt denies heavy vaginal bleeding, pain well controlled on PO medication. Patient has not yet ambulated, tolerating a regular diet, or passed flatus. Breast feeding.  Denies headache, chest pain, SOB, fever, chills, nausea, vomiting, extermity pain or swelling.       PAST MEDICAL & SURGICAL HISTORY:  Fibroid uterus  No significant past surgical history      Physical Exam  Vital Signs Last 24 Hrs  T(C): 36.7 (07 Apr 2023 01:58), Max: 36.9 (07 Apr 2023 01:16)  T(F): 98.1 (07 Apr 2023 01:58), Max: 98.4 (07 Apr 2023 01:16)  HR: 86 (07 Apr 2023 01:58) (50 - 119)  BP: 118/72 (07 Apr 2023 01:58) (89/53 - 136/53)  BP(mean): 90 (07 Apr 2023 01:58) (90 - 90)  RR: 18 (07 Apr 2023 01:58) (17 - 24)  SpO2: 98% (07 Apr 2023 01:31) (89% - 100%)    Parameters below as of 07 Apr 2023 01:16  Patient On (Oxygen Delivery Method): room air    f/u UO (szr69sm/hr): (9360-3799) 400cc    Gen: AAOx3, NAD  Heart: RRR, no M/R/G  Lungs: CTAB  Fundus: firm, below umbilicus, nontender   Wound: Pfannenstiel incision, steris in place c/d/i   Abd: Soft, appropriately tender near incision site, mildly distended, BS+  Lochia: minimal  Ext: No calf tenderness, no swelling    Labs:                        11.7   6.68  )-----------( 200      ( 05 Apr 2023 07:45 )             36.2        MEDICATIONS  (STANDING):  acetaminophen     Tablet .. 975 milliGRAM(s) Oral <User Schedule>  ampicillin  IVPB      ampicillin  IVPB 2 Gram(s) IV Intermittent every 6 hours  clindamycin IVPB      clindamycin IVPB 900 milliGRAM(s) IV Intermittent every 8 hours  diphtheria/tetanus/pertussis (acellular) Vaccine (Adacel) 0.5 milliLiter(s) IntraMuscular once  enoxaparin Injectable 40 milliGRAM(s) SubCutaneous every 24 hours  gentamicin   IVPB 80 milliGRAM(s) IV Intermittent every 8 hours  ibuprofen  Tablet. 600 milliGRAM(s) Oral every 6 hours  ketorolac   Injectable 30 milliGRAM(s) IV Push every 6 hours  lactated ringers. 1000 milliLiter(s) (125 mL/Hr) IV Continuous <Continuous>  oxytocin Infusion 333.333 milliUNIT(s)/Min (1000 mL/Hr) IV Continuous <Continuous>  senna 2 Tablet(s) Oral at bedtime  simethicone 80 milliGRAM(s) Chew every 6 hours    MEDICATIONS  (PRN):  diphenhydrAMINE 25 milliGRAM(s) Oral every 6 hours PRN Pruritus  lanolin Ointment 1 Application(s) Topical every 6 hours PRN Sore Nipples  magnesium hydroxide Suspension 30 milliLiter(s) Oral two times a day PRN Constipation  oxyCODONE    IR 5 milliGRAM(s) Oral every 3 hours PRN Moderate to Severe Pain (4-10)  oxyCODONE    IR 5 milliGRAM(s) Oral once PRN Moderate to Severe Pain (4-10)       RACHEL QUEZADA  37y  Female    PGY3 Note:    Pt is POD#1. Pt is recovering well, no acute complaints. Pt denies heavy vaginal bleeding, pain well controlled on PO medication. Patient has not yet ambulated, tolerating a regular diet, or passed flatus. Breast feeding. baxter in place draining adequate clear urine. Denies headache, chest pain, SOB, fever, chills, nausea, vomiting, extermity pain or swelling.       PAST MEDICAL & SURGICAL HISTORY:  Fibroid uterus  No significant past surgical history      Physical Exam  Vital Signs Last 24 Hrs  T(C): 36.7 (07 Apr 2023 01:58), Max: 36.9 (07 Apr 2023 01:16)  T(F): 98.1 (07 Apr 2023 01:58), Max: 98.4 (07 Apr 2023 01:16)  HR: 86 (07 Apr 2023 01:58) (50 - 119)  BP: 118/72 (07 Apr 2023 01:58) (89/53 - 136/53)  BP(mean): 90 (07 Apr 2023 01:58) (90 - 90)  RR: 18 (07 Apr 2023 01:58) (17 - 24)  SpO2: 98% (07 Apr 2023 01:31) (89% - 100%)    Parameters below as of 07 Apr 2023 01:16  Patient On (Oxygen Delivery Method): room air    f/u UO (kgq65mj/hr): (7994-1941) 400cc    Gen: AAOx3, NAD  Heart: RRR, no M/R/G  Lungs: CTAB  Fundus: large anterior fibroid palpable above the level of the umbilicus, appropriately tender   Wound: Pfannenstiel incision, dermabond in place c/d/i  Abd: Soft, appropriately tender near incision site, mildly distended, BS+  Lochia: minimal  Ext: No calf tenderness, no swelling    Labs:                        11.7   6.68  )-----------( 200      ( 05 Apr 2023 07:45 )             36.2        MEDICATIONS  (STANDING):  acetaminophen     Tablet .. 975 milliGRAM(s) Oral <User Schedule>  ampicillin  IVPB      ampicillin  IVPB 2 Gram(s) IV Intermittent every 6 hours  clindamycin IVPB      clindamycin IVPB 900 milliGRAM(s) IV Intermittent every 8 hours  diphtheria/tetanus/pertussis (acellular) Vaccine (Adacel) 0.5 milliLiter(s) IntraMuscular once  enoxaparin Injectable 40 milliGRAM(s) SubCutaneous every 24 hours  gentamicin   IVPB 80 milliGRAM(s) IV Intermittent every 8 hours  ibuprofen  Tablet. 600 milliGRAM(s) Oral every 6 hours  ketorolac   Injectable 30 milliGRAM(s) IV Push every 6 hours  lactated ringers. 1000 milliLiter(s) (125 mL/Hr) IV Continuous <Continuous>  oxytocin Infusion 333.333 milliUNIT(s)/Min (1000 mL/Hr) IV Continuous <Continuous>  senna 2 Tablet(s) Oral at bedtime  simethicone 80 milliGRAM(s) Chew every 6 hours    MEDICATIONS  (PRN):  diphenhydrAMINE 25 milliGRAM(s) Oral every 6 hours PRN Pruritus  lanolin Ointment 1 Application(s) Topical every 6 hours PRN Sore Nipples  magnesium hydroxide Suspension 30 milliLiter(s) Oral two times a day PRN Constipation  oxyCODONE    IR 5 milliGRAM(s) Oral every 3 hours PRN Moderate to Severe Pain (4-10)  oxyCODONE    IR 5 milliGRAM(s) Oral once PRN Moderate to Severe Pain (4-10)       RACHEL QUEZADA  37y  Female    PGY3 Note:    Pt is POD#1. Pt is recovering well, no acute complaints. Pt denies heavy vaginal bleeding, pain well controlled on PO medication. Patient has not yet ambulated. She is tolerating a regular diet, passing flatus. Manning in place draining adequate clear urine. Denies dizziness, SOB, chest pain, fever, chills, nausea, vomiting, extremity pain or swelling.       PAST MEDICAL & SURGICAL HISTORY:  Fibroid uterus  No significant past surgical history      Physical Exam  Vital Signs Last 24 Hrs  T(C): 36.7 (07 Apr 2023 01:58), Max: 36.9 (07 Apr 2023 01:16)  T(F): 98.1 (07 Apr 2023 01:58), Max: 98.4 (07 Apr 2023 01:16)  HR: 86 (07 Apr 2023 01:58) (50 - 119)  BP: 118/72 (07 Apr 2023 01:58) (89/53 - 136/53)  BP(mean): 90 (07 Apr 2023 01:58) (90 - 90)  RR: 18 (07 Apr 2023 01:58) (17 - 24)  SpO2: 98% (07 Apr 2023 01:31) (89% - 100%)    Parameters below as of 07 Apr 2023 01:16  Patient On (Oxygen Delivery Method): room air    f/u UO (jbm35lu/hr): (7724-3283) 400cc    Gen: AAOx3, NAD  Heart: RRR, no M/R/G  Lungs: CTAB  Fundus: large anterior fibroid palpable above the level of the umbilicus, appropriately tender   Wound: Pfannenstiel incision, dermabond in place c/d/i  Abd: Soft, mildly distended, BS+  Lochia: minimal  Ext: No calf tenderness, no swelling    Labs:                        11.7   6.68  )-----------( 200      ( 05 Apr 2023 07:45 )             36.2        MEDICATIONS  (STANDING):  acetaminophen     Tablet .. 975 milliGRAM(s) Oral <User Schedule>  ampicillin  IVPB      ampicillin  IVPB 2 Gram(s) IV Intermittent every 6 hours  clindamycin IVPB      clindamycin IVPB 900 milliGRAM(s) IV Intermittent every 8 hours  diphtheria/tetanus/pertussis (acellular) Vaccine (Adacel) 0.5 milliLiter(s) IntraMuscular once  enoxaparin Injectable 40 milliGRAM(s) SubCutaneous every 24 hours  gentamicin   IVPB 80 milliGRAM(s) IV Intermittent every 8 hours  ibuprofen  Tablet. 600 milliGRAM(s) Oral every 6 hours  ketorolac   Injectable 30 milliGRAM(s) IV Push every 6 hours  lactated ringers. 1000 milliLiter(s) (125 mL/Hr) IV Continuous <Continuous>  oxytocin Infusion 333.333 milliUNIT(s)/Min (1000 mL/Hr) IV Continuous <Continuous>  senna 2 Tablet(s) Oral at bedtime  simethicone 80 milliGRAM(s) Chew every 6 hours    MEDICATIONS  (PRN):  diphenhydrAMINE 25 milliGRAM(s) Oral every 6 hours PRN Pruritus  lanolin Ointment 1 Application(s) Topical every 6 hours PRN Sore Nipples  magnesium hydroxide Suspension 30 milliLiter(s) Oral two times a day PRN Constipation  oxyCODONE    IR 5 milliGRAM(s) Oral every 3 hours PRN Moderate to Severe Pain (4-10)  oxyCODONE    IR 5 milliGRAM(s) Oral once PRN Moderate to Severe Pain (4-10)

## 2023-04-08 LAB
ALBUMIN SERPL ELPH-MCNC: 2.8 G/DL — LOW (ref 3.5–5.2)
ALP SERPL-CCNC: 103 U/L — SIGNIFICANT CHANGE UP (ref 30–115)
ALT FLD-CCNC: 6 U/L — SIGNIFICANT CHANGE UP (ref 0–41)
ANION GAP SERPL CALC-SCNC: 6 MMOL/L — LOW (ref 7–14)
AST SERPL-CCNC: 23 U/L — SIGNIFICANT CHANGE UP (ref 0–41)
BASOPHILS # BLD AUTO: 0.02 K/UL — SIGNIFICANT CHANGE UP (ref 0–0.2)
BASOPHILS # BLD AUTO: 0.03 K/UL — SIGNIFICANT CHANGE UP (ref 0–0.2)
BASOPHILS NFR BLD AUTO: 0.2 % — SIGNIFICANT CHANGE UP (ref 0–1)
BASOPHILS NFR BLD AUTO: 0.2 % — SIGNIFICANT CHANGE UP (ref 0–1)
BILIRUB SERPL-MCNC: <0.2 MG/DL — SIGNIFICANT CHANGE UP (ref 0.2–1.2)
BUN SERPL-MCNC: 5 MG/DL — LOW (ref 10–20)
CALCIUM SERPL-MCNC: 8.4 MG/DL — SIGNIFICANT CHANGE UP (ref 8.4–10.5)
CHLORIDE SERPL-SCNC: 104 MMOL/L — SIGNIFICANT CHANGE UP (ref 98–110)
CO2 SERPL-SCNC: 25 MMOL/L — SIGNIFICANT CHANGE UP (ref 17–32)
CREAT SERPL-MCNC: 0.5 MG/DL — LOW (ref 0.7–1.5)
CULTURE RESULTS: NO GROWTH — SIGNIFICANT CHANGE UP
EGFR: 125 ML/MIN/1.73M2 — SIGNIFICANT CHANGE UP
EOSINOPHIL # BLD AUTO: 0.03 K/UL — SIGNIFICANT CHANGE UP (ref 0–0.7)
EOSINOPHIL # BLD AUTO: 0.04 K/UL — SIGNIFICANT CHANGE UP (ref 0–0.7)
EOSINOPHIL NFR BLD AUTO: 0.2 % — SIGNIFICANT CHANGE UP (ref 0–8)
EOSINOPHIL NFR BLD AUTO: 0.3 % — SIGNIFICANT CHANGE UP (ref 0–8)
GLUCOSE SERPL-MCNC: 102 MG/DL — HIGH (ref 70–99)
HCT VFR BLD CALC: 25 % — LOW (ref 37–47)
HCT VFR BLD CALC: 25.8 % — LOW (ref 37–47)
HGB BLD-MCNC: 8 G/DL — LOW (ref 12–16)
HGB BLD-MCNC: 8.4 G/DL — LOW (ref 12–16)
IMM GRANULOCYTES NFR BLD AUTO: 0.7 % — HIGH (ref 0.1–0.3)
IMM GRANULOCYTES NFR BLD AUTO: 1.1 % — HIGH (ref 0.1–0.3)
LYMPHOCYTES # BLD AUTO: 1.72 K/UL — SIGNIFICANT CHANGE UP (ref 1.2–3.4)
LYMPHOCYTES # BLD AUTO: 1.97 K/UL — SIGNIFICANT CHANGE UP (ref 1.2–3.4)
LYMPHOCYTES # BLD AUTO: 13.9 % — LOW (ref 20.5–51.1)
LYMPHOCYTES # BLD AUTO: 16 % — LOW (ref 20.5–51.1)
MCHC RBC-ENTMCNC: 28.7 PG — SIGNIFICANT CHANGE UP (ref 27–31)
MCHC RBC-ENTMCNC: 29 PG — SIGNIFICANT CHANGE UP (ref 27–31)
MCHC RBC-ENTMCNC: 32 G/DL — SIGNIFICANT CHANGE UP (ref 32–37)
MCHC RBC-ENTMCNC: 32.6 G/DL — SIGNIFICANT CHANGE UP (ref 32–37)
MCV RBC AUTO: 89 FL — SIGNIFICANT CHANGE UP (ref 81–99)
MCV RBC AUTO: 89.6 FL — SIGNIFICANT CHANGE UP (ref 81–99)
MONOCYTES # BLD AUTO: 0.4 K/UL — SIGNIFICANT CHANGE UP (ref 0.1–0.6)
MONOCYTES # BLD AUTO: 0.52 K/UL — SIGNIFICANT CHANGE UP (ref 0.1–0.6)
MONOCYTES NFR BLD AUTO: 3.2 % — SIGNIFICANT CHANGE UP (ref 1.7–9.3)
MONOCYTES NFR BLD AUTO: 4.2 % — SIGNIFICANT CHANGE UP (ref 1.7–9.3)
NEUTROPHILS # BLD AUTO: 10.02 K/UL — HIGH (ref 1.4–6.5)
NEUTROPHILS # BLD AUTO: 9.77 K/UL — HIGH (ref 1.4–6.5)
NEUTROPHILS NFR BLD AUTO: 79.2 % — HIGH (ref 42.2–75.2)
NEUTROPHILS NFR BLD AUTO: 80.8 % — HIGH (ref 42.2–75.2)
NRBC # BLD: 0 /100 WBCS — SIGNIFICANT CHANGE UP (ref 0–0)
NRBC # BLD: 0 /100 WBCS — SIGNIFICANT CHANGE UP (ref 0–0)
PLATELET # BLD AUTO: 186 K/UL — SIGNIFICANT CHANGE UP (ref 130–400)
PLATELET # BLD AUTO: 186 K/UL — SIGNIFICANT CHANGE UP (ref 130–400)
POTASSIUM SERPL-MCNC: 4 MMOL/L — SIGNIFICANT CHANGE UP (ref 3.5–5)
POTASSIUM SERPL-SCNC: 4 MMOL/L — SIGNIFICANT CHANGE UP (ref 3.5–5)
PROT SERPL-MCNC: 4.9 G/DL — LOW (ref 6–8)
RBC # BLD: 2.79 M/UL — LOW (ref 4.2–5.4)
RBC # BLD: 2.9 M/UL — LOW (ref 4.2–5.4)
RBC # FLD: 15 % — HIGH (ref 11.5–14.5)
RBC # FLD: 15.2 % — HIGH (ref 11.5–14.5)
SODIUM SERPL-SCNC: 135 MMOL/L — SIGNIFICANT CHANGE UP (ref 135–146)
SPECIMEN SOURCE: SIGNIFICANT CHANGE UP
WBC # BLD: 12.35 K/UL — HIGH (ref 4.8–10.8)
WBC # BLD: 12.4 K/UL — HIGH (ref 4.8–10.8)
WBC # FLD AUTO: 12.35 K/UL — HIGH (ref 4.8–10.8)
WBC # FLD AUTO: 12.4 K/UL — HIGH (ref 4.8–10.8)

## 2023-04-08 RX ORDER — OXYCODONE HYDROCHLORIDE 5 MG/1
5 TABLET ORAL
Refills: 0 | Status: DISCONTINUED | OUTPATIENT
Start: 2023-04-08 | End: 2023-04-09

## 2023-04-08 RX ORDER — ASCORBIC ACID 60 MG
1 TABLET,CHEWABLE ORAL
Qty: 30 | Refills: 1
Start: 2023-04-08 | End: 2023-06-06

## 2023-04-08 RX ORDER — FERROUS FUMARATE 350(115)MG
1 TABLET ORAL
Qty: 30 | Refills: 1
Start: 2023-04-08 | End: 2023-06-06

## 2023-04-08 RX ORDER — IRON SUCROSE 20 MG/ML
200 INJECTION, SOLUTION INTRAVENOUS ONCE
Refills: 0 | Status: COMPLETED | OUTPATIENT
Start: 2023-04-09 | End: 2023-04-09

## 2023-04-08 RX ORDER — DOCUSATE SODIUM 100 MG
1 CAPSULE ORAL
Qty: 60 | Refills: 1
Start: 2023-04-08 | End: 2023-06-06

## 2023-04-08 RX ORDER — OXYCODONE HYDROCHLORIDE 5 MG/1
5 TABLET ORAL ONCE
Refills: 0 | Status: DISCONTINUED | OUTPATIENT
Start: 2023-04-08 | End: 2023-04-09

## 2023-04-08 RX ORDER — OXYCODONE HYDROCHLORIDE 5 MG/1
1 TABLET ORAL
Qty: 15 | Refills: 0
Start: 2023-04-08

## 2023-04-08 RX ORDER — ACETAMINOPHEN 500 MG
2 TABLET ORAL
Qty: 30 | Refills: 0
Start: 2023-04-08 | End: 2023-04-12

## 2023-04-08 RX ORDER — IBUPROFEN 200 MG
1 TABLET ORAL
Qty: 20 | Refills: 0
Start: 2023-04-08 | End: 2023-04-12

## 2023-04-08 RX ADMIN — MAGNESIUM HYDROXIDE 30 MILLILITER(S): 400 TABLET, CHEWABLE ORAL at 14:49

## 2023-04-08 RX ADMIN — Medication 975 MILLIGRAM(S): at 15:15

## 2023-04-08 RX ADMIN — SIMETHICONE 80 MILLIGRAM(S): 80 TABLET, CHEWABLE ORAL at 07:11

## 2023-04-08 RX ADMIN — ENOXAPARIN SODIUM 40 MILLIGRAM(S): 100 INJECTION SUBCUTANEOUS at 07:11

## 2023-04-08 RX ADMIN — Medication 975 MILLIGRAM(S): at 08:41

## 2023-04-08 RX ADMIN — SIMETHICONE 80 MILLIGRAM(S): 80 TABLET, CHEWABLE ORAL at 11:16

## 2023-04-08 RX ADMIN — Medication 975 MILLIGRAM(S): at 03:20

## 2023-04-08 RX ADMIN — SIMETHICONE 80 MILLIGRAM(S): 80 TABLET, CHEWABLE ORAL at 17:06

## 2023-04-08 RX ADMIN — Medication 975 MILLIGRAM(S): at 21:22

## 2023-04-08 RX ADMIN — Medication 975 MILLIGRAM(S): at 20:00

## 2023-04-08 RX ADMIN — Medication 600 MILLIGRAM(S): at 12:15

## 2023-04-08 RX ADMIN — Medication 600 MILLIGRAM(S): at 17:06

## 2023-04-08 RX ADMIN — OXYCODONE HYDROCHLORIDE 5 MILLIGRAM(S): 5 TABLET ORAL at 02:23

## 2023-04-08 RX ADMIN — Medication 600 MILLIGRAM(S): at 18:05

## 2023-04-08 RX ADMIN — Medication 600 MILLIGRAM(S): at 23:01

## 2023-04-08 RX ADMIN — Medication 600 MILLIGRAM(S): at 07:11

## 2023-04-08 RX ADMIN — Medication 10 MILLIGRAM(S): at 15:56

## 2023-04-08 RX ADMIN — Medication 600 MILLIGRAM(S): at 11:16

## 2023-04-08 RX ADMIN — SENNA PLUS 2 TABLET(S): 8.6 TABLET ORAL at 22:52

## 2023-04-08 RX ADMIN — SIMETHICONE 80 MILLIGRAM(S): 80 TABLET, CHEWABLE ORAL at 22:52

## 2023-04-08 RX ADMIN — Medication 975 MILLIGRAM(S): at 14:15

## 2023-04-08 RX ADMIN — OXYCODONE HYDROCHLORIDE 5 MILLIGRAM(S): 5 TABLET ORAL at 22:52

## 2023-04-08 RX ADMIN — Medication 600 MILLIGRAM(S): at 00:26

## 2023-04-08 RX ADMIN — Medication 975 MILLIGRAM(S): at 09:40

## 2023-04-08 NOTE — PROGRESS NOTE ADULT - ATTENDING COMMENTS
I saw patient at bedside, resting, denies CP, SOB, N/V, tolerating diet, voided without difficulty, +flatus, no BM.  Patient states that since last night she has had increased RLQ pain that was more severe initially but has improved with ambulation some, strict precautions given.  She states pain is mostly controlled with meds.  She denies fever/chills, dizziness/weakness or other complaints.  She has been ambulating and attempting to breastfeed.  Hgb on repeat dropped slightly so repeat was done and stable.  She denies sx of anemia and is s/p 1 dose IV venofer.     vitals stable, hgb 8.4---> 8, BUN/cr 5/<0.5    Chest: CTA b/l, no crackles, rales, RRR, breasts soft  Abd: soft, ND, enlarged fibroid uterus ( appears firm but difficult to decipher due to fibroids), +BS, appropriately tender, incision C/D/I with Dermabond, no erythema or induration- no significant increased pain in RLQ upon palpation, no rebound or guarding  EXT: NT, 1+ b/l LE edema, negative homans  vag: moderate lochia    A: 38yo  s/p emergent P C/S for fetal bradycardia/PTL, fibroid uterus, acute blood loss anemia, POD#2    P: continue current postop and PP care      regular diet      milk of mag/dulcolax until BM      IV venofer 200 tomorrow AM      pain mgmt as ordered      encouraged ambulation       pain, bleeding, anemia and infection precautions discussed       will reevaluate as clinically indicated       possible d/c home in AM if pain controlled and stable
see separate attending note- AF
I saw patient at bedside, resting, denies CP, SOB, N/V, tolerating diet, voided without difficulty after TOV, +flatus, no BM.  Patient states that the pain intensity waxes ans wanes, but with ordered pain mgmt she states mostly controlled.  She denies fever/chills, dizziness/weakness or other complaints.  She has been ambulating minimally and attempting to breastfeed.      vitals stable, hgb 9, BUN/cr <3/<0.5    Chest: CTA b/l, no crackles, rales, RRR, breasts soft  Abd: soft, ND, enlarged fibroid uterus ( appears firm but difficult to decipher due to fibroids), +BS, appropriately tender, incision C/D/I with Dermabond, no erythema or induration  EXT: NT, 1+ b/l LE edema, negative homans  vag: moderate lochia    A: 38yo  s/p emergent P C/S for fetal bradycardia/PTL, fibroid uterus, acute blood loss anemia, POD#1    P: continue current postop and PP care      regular diet      IV venofer 200 today      continue IV abx q 24 hour post op      repeat CBC/CMP in PM      pain mgmt as ordered      encouraged ambulation       pain, bleeding and infection precautions discussed       will reevaluate as clinically indicated

## 2023-04-08 NOTE — PROGRESS NOTE ADULT - ASSESSMENT
A/P: 36yo P1 s/p emergent primary LTCS for fetal bradycardia, POD#2, rh neg, recovering well     - continue routine post op care  - s/p antibiotics for 24hrs  - encourage ambulation, PO hydration  - monitor vitals, bleeding   - simethicone/senna  - DVT prophylaxis: lovenox + scds  - pain mgmt prn  - rhogam ordered, fetal screen neg    Dr. Boo and Dr. Reinoso to be made aware

## 2023-04-08 NOTE — PROGRESS NOTE ADULT - SUBJECTIVE AND OBJECTIVE BOX
RACHEL QUEZADA  37y  Female    PGY1 Note:    Pt is POD#2. Pt is recovering well, no acute complaints. Pt denies heavy vaginal bleeding. She endorses some increasing pain compared to yesterday now that her spinal has worn off but her pain is controlled on PO medication. Patient has ambulated. She is tolerating a regular diet, passing flatus, voiding. Denies dizziness, SOB, chest pain, fever, chills, nausea, vomiting, extremity pain or swelling.     PAST MEDICAL & SURGICAL HISTORY:  Fibroid uterus  No significant past surgical history    Physical Exam  Vital Signs Last 24 Hrs  T(C): 36.1 (08 Apr 2023 03:34), Max: 36.9 (07 Apr 2023 07:54)  T(F): 97 (08 Apr 2023 03:34), Max: 98.4 (07 Apr 2023 07:54)  HR: 88 (08 Apr 2023 03:34) (76 - 102)  BP: 119/71 (08 Apr 2023 03:34) (100/58 - 119/71)  RR: 18 (08 Apr 2023 03:34) (18 - 20)    Gen: AAOx3, NAD  Heart: RRR, no M/R/G  Lungs: CTAB  Fundus: large anterior fibroid palpable above the level of the umbilicus, appropriately tender   Wound: Pfannenstiel incision, dermabond in place c/d/i  Abd: Soft, mildly distended, BS+  Lochia: minimal  Ext: No calf tenderness, no swelling    Labs:                        8.4    12.40 )-----------( 186      ( 08 Apr 2023 01:34 )             25.8                         11.7   6.68  )-----------( 200      ( 05 Apr 2023 07:45 )             36.2      MEDICATIONS  (STANDING):  acetaminophen     Tablet .. 975 milliGRAM(s) Oral <User Schedule>  diphtheria/tetanus/pertussis (acellular) Vaccine (Adacel) 0.5 milliLiter(s) IntraMuscular once  enoxaparin Injectable 40 milliGRAM(s) SubCutaneous every 24 hours  ibuprofen  Tablet. 600 milliGRAM(s) Oral every 6 hours  oxytocin Infusion 333.333 milliUNIT(s)/Min (1000 mL/Hr) IV Continuous <Continuous>  senna 2 Tablet(s) Oral at bedtime  simethicone 80 milliGRAM(s) Chew every 6 hours    MEDICATIONS  (PRN):  diphenhydrAMINE 25 milliGRAM(s) Oral every 6 hours PRN Pruritus  lanolin Ointment 1 Application(s) Topical every 6 hours PRN Sore Nipples  magnesium hydroxide Suspension 30 milliLiter(s) Oral two times a day PRN Constipation  oxyCODONE    IR 5 milliGRAM(s) Oral once PRN Moderate to Severe Pain (4-10)  oxyCODONE    IR 5 milliGRAM(s) Oral every 3 hours PRN Moderate to Severe Pain (4-10)  oxyCODONE    IR 5 milliGRAM(s) Oral once PRN Moderate to Severe Pain (4-10) RACHEL QUEZADA  37y  Female    PGY1 Note:    Pt is POD#2. Pt is recovering well, no acute complaints. Pt denies heavy vaginal bleeding. She endorses some increasing pain compared to yesterday now that her spinal has worn off but her pain is controlled on PO medication. Patient has ambulated. She is tolerating a regular diet, passing flatus, voiding. Denies dizziness, SOB, chest pain, fever, chills, nausea, vomiting, extremity pain or swelling.     PAST MEDICAL & SURGICAL HISTORY:  Fibroid uterus  No significant past surgical history    Physical Exam  Vital Signs Last 24 Hrs  T(C): 36.1 (08 Apr 2023 03:34), Max: 36.9 (07 Apr 2023 07:54)  T(F): 97 (08 Apr 2023 03:34), Max: 98.4 (07 Apr 2023 07:54)  HR: 88 (08 Apr 2023 03:34) (76 - 102)  BP: 119/71 (08 Apr 2023 03:34) (100/58 - 119/71)  RR: 18 (08 Apr 2023 03:34) (18 - 20)    Gen: AAOx3, NAD  Heart: RRR, no M/R/G  Lungs: CTAB  Fundus: large anterior fibroid palpable above the level of the umbilicus, appropriately tender   Wound: Pfannenstiel incision, dermabond in place c/d/i  Abd: Soft, mildly distended, BS+  Lochia: minimal  Ext: No calf tenderness, 1+ swelling    Labs:                        8.4    12.40 )-----------( 186      ( 08 Apr 2023 01:34 )             25.8                         11.7   6.68  )-----------( 200      ( 05 Apr 2023 07:45 )             36.2      MEDICATIONS  (STANDING):  acetaminophen     Tablet .. 975 milliGRAM(s) Oral <User Schedule>  diphtheria/tetanus/pertussis (acellular) Vaccine (Adacel) 0.5 milliLiter(s) IntraMuscular once  enoxaparin Injectable 40 milliGRAM(s) SubCutaneous every 24 hours  ibuprofen  Tablet. 600 milliGRAM(s) Oral every 6 hours  oxytocin Infusion 333.333 milliUNIT(s)/Min (1000 mL/Hr) IV Continuous <Continuous>  senna 2 Tablet(s) Oral at bedtime  simethicone 80 milliGRAM(s) Chew every 6 hours    MEDICATIONS  (PRN):  diphenhydrAMINE 25 milliGRAM(s) Oral every 6 hours PRN Pruritus  lanolin Ointment 1 Application(s) Topical every 6 hours PRN Sore Nipples  magnesium hydroxide Suspension 30 milliLiter(s) Oral two times a day PRN Constipation  oxyCODONE    IR 5 milliGRAM(s) Oral once PRN Moderate to Severe Pain (4-10)  oxyCODONE    IR 5 milliGRAM(s) Oral every 3 hours PRN Moderate to Severe Pain (4-10)  oxyCODONE    IR 5 milliGRAM(s) Oral once PRN Moderate to Severe Pain (4-10)

## 2023-04-09 ENCOUNTER — TRANSCRIPTION ENCOUNTER (OUTPATIENT)
Age: 38
End: 2023-04-09

## 2023-04-09 VITALS
HEART RATE: 67 BPM | TEMPERATURE: 98 F | SYSTOLIC BLOOD PRESSURE: 99 MMHG | DIASTOLIC BLOOD PRESSURE: 60 MMHG | RESPIRATION RATE: 18 BRPM

## 2023-04-09 RX ORDER — ACETAMINOPHEN 500 MG
3 TABLET ORAL
Qty: 0 | Refills: 0 | DISCHARGE
Start: 2023-04-09

## 2023-04-09 RX ORDER — IBUPROFEN 200 MG
1 TABLET ORAL
Qty: 0 | Refills: 0 | DISCHARGE
Start: 2023-04-09

## 2023-04-09 RX ORDER — SIMETHICONE 80 MG/1
1 TABLET, CHEWABLE ORAL
Qty: 0 | Refills: 0 | DISCHARGE
Start: 2023-04-09

## 2023-04-09 RX ADMIN — ENOXAPARIN SODIUM 40 MILLIGRAM(S): 100 INJECTION SUBCUTANEOUS at 05:43

## 2023-04-09 RX ADMIN — IRON SUCROSE 110 MILLIGRAM(S): 20 INJECTION, SOLUTION INTRAVENOUS at 07:54

## 2023-04-09 RX ADMIN — Medication 600 MILLIGRAM(S): at 00:00

## 2023-04-09 RX ADMIN — SIMETHICONE 80 MILLIGRAM(S): 80 TABLET, CHEWABLE ORAL at 05:43

## 2023-04-09 RX ADMIN — Medication 600 MILLIGRAM(S): at 12:35

## 2023-04-09 RX ADMIN — Medication 600 MILLIGRAM(S): at 05:43

## 2023-04-09 RX ADMIN — Medication 975 MILLIGRAM(S): at 08:27

## 2023-04-09 RX ADMIN — Medication 600 MILLIGRAM(S): at 11:35

## 2023-04-09 RX ADMIN — OXYCODONE HYDROCHLORIDE 5 MILLIGRAM(S): 5 TABLET ORAL at 04:26

## 2023-04-09 RX ADMIN — Medication 975 MILLIGRAM(S): at 03:13

## 2023-04-09 RX ADMIN — SIMETHICONE 80 MILLIGRAM(S): 80 TABLET, CHEWABLE ORAL at 11:35

## 2023-04-09 RX ADMIN — OXYCODONE HYDROCHLORIDE 5 MILLIGRAM(S): 5 TABLET ORAL at 00:00

## 2023-04-09 RX ADMIN — Medication 975 MILLIGRAM(S): at 09:25

## 2023-04-09 RX ADMIN — OXYCODONE HYDROCHLORIDE 5 MILLIGRAM(S): 5 TABLET ORAL at 05:00

## 2023-04-09 RX ADMIN — Medication 975 MILLIGRAM(S): at 02:32

## 2023-04-09 NOTE — DISCHARGE NOTE OB - CARE PROVIDER_API CALL
Sherice Reinoso)  Obstetrics and Gynecology  [facility] [facility], [address]  [place] [address]  Phone: [phone]  Fax: [phone]  Follow Up Time:

## 2023-04-09 NOTE — DISCHARGE NOTE OB - PLAN OF CARE
Keep incisions clean and dry. No heavy lifting x4 weeks. Nothing in the vagina for 6 weeks - no sex, tampons, douching, tub baths or pools. May Shower. If you have a fever over 100.4F, severe pain or severe bleeding, please call your doctor or visit the emergency room. Follow up in 1 week for incision check and 6 weeks for postpartum check with your doctor.

## 2023-04-09 NOTE — DISCHARGE NOTE OB - MEDICATION SUMMARY - MEDICATIONS TO TAKE
I will START or STAY ON the medications listed below when I get home from the hospital:    acetaminophen 325 mg oral tablet  -- 3 tab(s) by mouth every 6 hours  -- Indication: For pain    ibuprofen 600 mg oral tablet  -- 1 tab(s) by mouth every 6 hours  -- Indication: For pain    Colace 100 mg oral capsule  -- 1 cap(s) by mouth 2 times a day  -- Indication: For constipation    simethicone 80 mg oral tablet, chewable  -- 1 tab(s) by mouth every 6 hours  -- Indication: For pain

## 2023-04-09 NOTE — PROGRESS NOTE ADULT - SUBJECTIVE AND OBJECTIVE BOX
Patient seen at bedside resting comfortably, denies CP, SOB< N/V, and states her pain is much improved from yesterday and mostly controlled with oral pain mgmt.  She is voiding without difficulty and +flatus and +BM.  She denies weakness dizziness, palpitations or other signs of anemia.  She denies fever/chills, fouls smelling lochia or other signs of infection.  She has been ambulating without difficulty and is feeling well and would like to go home today.  She is attempting breast feed as well as bottle feed. She received second dose of IV venofer this AM.    vitals stable, hgb/hct 8/25- stable    Chest: CTA b/l with good air entry all quadrants, no crackles/rales, RRR, breasts soft  Abd: +BS, soft around enlarged hard fibroid uterus, appropriately tender, incision C/D/I with dermaband, no evidence of erythema/induration or signs of infection- healing well  LE: 1+ b/l LE edema to mid calf, NT  Vag: light lochia    A: 36yo  s/p emergency C/S for fetal bradycardia, large fibroid uterus, acute blood loss anemia, POD#3    P: continue current postop and PP care      regular diet       increase hydration       ambulation encouraged, activin as tolerated       continue current pain mgmt         iron, vit c, colace       pain, bleeding, anemia, infection precautions discussed       wound care counseled       d/c home and f/u office 2 wks or PRN

## 2023-04-09 NOTE — DISCHARGE NOTE OB - PATIENT PORTAL LINK FT
You can access the FollowMyHealth Patient Portal offered by [facility] by registering at the following website: [email or web address]. By joining [facility]’s FollowMyHealth portal, you will also be able to view your health information using other applications (apps) compatible with our system.

## 2023-04-09 NOTE — DISCHARGE NOTE OB - CARE PLAN
Principal Discharge DX:	 delivery delivered  Assessment and plan of treatment:	Keep incisions clean and dry. No heavy lifting x4 weeks. Nothing in the vagina for 6 weeks - no sex, tampons, douching, tub baths or pools. May Shower. If you have a fever over 100.4F, severe pain or severe bleeding, please call your doctor or visit the emergency room. Follow up in 1 week for incision check and 6 weeks for postpartum check with your doctor.   1

## 2023-04-09 NOTE — PROGRESS NOTE ADULT - ASSESSMENT
A/P: 36yo P1 s/p emergent primary LTCS for fetal bradycardia, POD#3, rh neg, recovering well     - continue routine post op care  - s/p antibiotics for 24hrs  - encourage ambulation, PO hydration  - monitor vitals, bleeding   - simethicone/senna  - DVT prophylaxis: lovenox + scds  - pain mgmt prn  - rhogam ordered, fetal screen neg    Dr. Monte and Dr. Reinoso to be made aware

## 2023-04-09 NOTE — PROGRESS NOTE ADULT - SUBJECTIVE AND OBJECTIVE BOX
RACHEL QUEZADA  37y  Female    PGY1 Note:    Pt is POD#3. Pt is recovering well, no acute complaints. Pt denies heavy vaginal bleeding. Pain well controlled. Patient has ambulated. She is tolerating a regular diet, passing flatus, voiding. Denies dizziness, SOB, chest pain, fever, chills, nausea, vomiting, extremity pain or swelling.     PAST MEDICAL & SURGICAL HISTORY:  Fibroid uterus  No significant past surgical history    Physical Exam  Vital Signs Last 24 Hrs  T(C): 37.1 (09 Apr 2023 00:01), Max: 37.1 (09 Apr 2023 00:01)  T(F): 98.7 (09 Apr 2023 00:01), Max: 98.7 (09 Apr 2023 00:01)  HR: 80 (09 Apr 2023 00:01) (80 - 86)  BP: 122/70 (09 Apr 2023 00:01) (109/68 - 122/70)  RR: 18 (09 Apr 2023 00:01) (18 - 18)      Gen: AAOx3, NAD  Heart: RRR, no M/R/G  Lungs: CTAB  Fundus: large anterior fibroid palpable above the level of the umbilicus, appropriately tender   Wound: Pfannenstiel incision, dermabond in place c/d/i  Abd: Soft, mildly distended, BS+  Lochia: minimal  Ext: No calf tenderness, 1+ swelling    Labs:                            8.4    12.40 )-----------( 186      ( 08 Apr 2023 01:34 )             25.8                         11.7   6.68  )-----------( 200      ( 05 Apr 2023 07:45 )             36.2      MEDICATIONS  (STANDING):  acetaminophen     Tablet .. 975 milliGRAM(s) Oral <User Schedule>  diphtheria/tetanus/pertussis (acellular) Vaccine (Adacel) 0.5 milliLiter(s) IntraMuscular once  enoxaparin Injectable 40 milliGRAM(s) SubCutaneous every 24 hours  ibuprofen  Tablet. 600 milliGRAM(s) Oral every 6 hours  oxytocin Infusion 333.333 milliUNIT(s)/Min (1000 mL/Hr) IV Continuous <Continuous>  senna 2 Tablet(s) Oral at bedtime  simethicone 80 milliGRAM(s) Chew every 6 hours    MEDICATIONS  (PRN):  diphenhydrAMINE 25 milliGRAM(s) Oral every 6 hours PRN Pruritus  lanolin Ointment 1 Application(s) Topical every 6 hours PRN Sore Nipples  magnesium hydroxide Suspension 30 milliLiter(s) Oral two times a day PRN Constipation  oxyCODONE    IR 5 milliGRAM(s) Oral once PRN Moderate to Severe Pain (4-10)  oxyCODONE    IR 5 milliGRAM(s) Oral every 3 hours PRN Moderate to Severe Pain (4-10)  oxyCODONE    IR 5 milliGRAM(s) Oral once PRN Moderate to Severe Pain (4-10)

## 2023-04-09 NOTE — DISCHARGE NOTE OB - HOSPITAL COURSE
patient admitted for labor, underwent stat c/s for fetal bradycardia, uncomplicated operative and postoperative course, discharged in stable condition.  patient admitted for  labor, underwent stat c/s for fetal bradycardia, uncomplicated operative and postoperative course with asymptomatic acute blood loss anemia, discharged in stable condition.

## 2023-04-09 NOTE — DISCHARGE NOTE OB - NS OB DC IMMUNIZATIONS MMR YN
I called the patient and reviewed US -Pelvis -and CT results - patient reviewed results on [facility] Portal -   Concern for diverticulitis vs   Malignancy - Had colonoscopy in 2021 - had a large rectal polyp and diverticulosis - still left lower quadrant pain - antibiotics were called in - advised clear liquids - for 2 days -- start antibiotics - low fiber diet - send -office visit in -3 weeks - check interpretation off US with radiology and GYN--- will likely schedule colonoscopy in  5-6 weeks - please copy Dr Anuja Novak No

## 2023-04-09 NOTE — DISCHARGE NOTE OB - NS MD DC FALL RISK RISK
For information on Fall & Injury Prevention, visit: [email or web address] OR  [email or web address] OR  https://www.cdc.gov/steadi/patient.html

## 2023-04-11 ENCOUNTER — APPOINTMENT (OUTPATIENT)
Dept: ANTEPARTUM | Facility: CLINIC | Age: 38
End: 2023-04-11

## 2023-04-12 DIAGNOSIS — O34.13 MATERNAL CARE FOR BENIGN TUMOR OF CORPUS UTERI, THIRD TRIMESTER: ICD-10-CM

## 2023-04-12 DIAGNOSIS — Z20.822 CONTACT WITH AND (SUSPECTED) EXPOSURE TO COVID-19: ICD-10-CM

## 2023-04-12 DIAGNOSIS — Z3A.36 36 WEEKS GESTATION OF PREGNANCY: ICD-10-CM

## 2023-04-12 DIAGNOSIS — O40.3XX0 POLYHYDRAMNIOS, THIRD TRIMESTER, NOT APPLICABLE OR UNSPECIFIED: ICD-10-CM

## 2023-04-12 LAB — SURGICAL PATHOLOGY STUDY: SIGNIFICANT CHANGE UP

## 2023-04-14 ENCOUNTER — APPOINTMENT (OUTPATIENT)
Dept: ANTEPARTUM | Facility: CLINIC | Age: 38
End: 2023-04-14

## 2023-04-18 ENCOUNTER — APPOINTMENT (OUTPATIENT)
Dept: ANTEPARTUM | Facility: CLINIC | Age: 38
End: 2023-04-18

## 2023-04-21 ENCOUNTER — APPOINTMENT (OUTPATIENT)
Dept: ANTEPARTUM | Facility: CLINIC | Age: 38
End: 2023-04-21

## 2023-04-25 ENCOUNTER — APPOINTMENT (OUTPATIENT)
Dept: ANTEPARTUM | Facility: CLINIC | Age: 38
End: 2023-04-25

## 2023-04-25 ENCOUNTER — APPOINTMENT (OUTPATIENT)
Dept: OBGYN | Facility: CLINIC | Age: 38
End: 2023-04-25
Payer: COMMERCIAL

## 2023-04-25 VITALS
DIASTOLIC BLOOD PRESSURE: 84 MMHG | HEART RATE: 103 BPM | WEIGHT: 146 LBS | TEMPERATURE: 98.3 F | SYSTOLIC BLOOD PRESSURE: 108 MMHG | BODY MASS INDEX: 28.66 KG/M2 | HEIGHT: 60 IN

## 2023-04-25 PROCEDURE — 99213 OFFICE O/P EST LOW 20 MIN: CPT

## 2023-04-28 ENCOUNTER — APPOINTMENT (OUTPATIENT)
Dept: ANTEPARTUM | Facility: CLINIC | Age: 38
End: 2023-04-28

## 2023-04-29 NOTE — HISTORY OF PRESENT ILLNESS
[Postpartum Consultation] : postpartum consultation [Last Pap Date: ___] : Last Pap Date: [unfilled] [Delivery Date: ___] : on [unfilled] [Primary C/S] : delivered by  section [Female] : Delivery History: baby girl [Wt. ___] : weighing [unfilled] [Breastfeeding] : currently nursing [BF with Difficulty] : nursing with difficulty [Resumed Menses] : has not resumed her menses [Resumed [place]] : has not resumed intercourse [Intended Contraception] : the patient does not intended to use contraception postpartum [Abdominal Pain] : no abdominal pain [Back Pain] : no back pain [Breast Pain] : no breast pain [Chest Pain] : no chest pain [S/Sx PP Depression] : no signs/symptoms of postpartum depression [Heavy Bleeding] : no heavy bleeding [Incisional Drainage] : no incisional drainage [Incisional Pain] : no incisional pain [Leg Pain] : no leg pain [Shortness of Breath] : no shortness of breath [Suicidal Ideation] : no suicidal ideation [Vaginal Discharge] : no vaginal discharge [None] : No associated symptoms are reported [Clean/Dry/Intact] : clean, dry and intact [Erythema] : not erythematous [Swelling] : not swollen [Dehiscence] : not dehisced [Doing Well] : is doing well [No Sign of Infection] : is showing no signs of infection [Excellent Pain Control] : has excellent pain control [FreeTextEntry8] : Postop exam [FreeTextEntry9] : High risk pregnancy, AMA, fibroid uterus, anemia, premature dilation-with  labor, polyhydramnios [de-identified] :  labor with emergency  section due to fetal bradycardia [de-identified] : Stable, pain controlled with medication, patient states normal urination and bowel movement [de-identified] : Abdomen soft, uterus firm, pain appropriate, Dermabond removed and incision cleaned, noted to be dry and intact and healing well with no evidence of infection [de-identified] : A: 38-year-old para 0-1-6-1 status post emergency  section, postop exam, BMI 28 [de-identified] : P: Postop exam, pain/bleeding/infection precautions, contraception counseling done-patient wants nothing at this time\par Follow-up 4 weeks or as needed

## 2023-05-15 ENCOUNTER — APPOINTMENT (OUTPATIENT)
Dept: OBGYN | Facility: CLINIC | Age: 38
End: 2023-05-15

## 2023-05-22 ENCOUNTER — APPOINTMENT (OUTPATIENT)
Dept: OBGYN | Facility: CLINIC | Age: 38
End: 2023-05-22
Payer: COMMERCIAL

## 2023-05-22 VITALS
WEIGHT: 148 LBS | SYSTOLIC BLOOD PRESSURE: 112 MMHG | DIASTOLIC BLOOD PRESSURE: 75 MMHG | HEIGHT: 60 IN | TEMPERATURE: 98.6 F | BODY MASS INDEX: 29.06 KG/M2 | HEART RATE: 84 BPM

## 2023-05-22 DIAGNOSIS — N91.5 OLIGOMENORRHEA, UNSPECIFIED: ICD-10-CM

## 2023-05-22 DIAGNOSIS — Z48.89 ENCOUNTER FOR OTHER SPECIFIED SURGICAL AFTERCARE: ICD-10-CM

## 2023-05-22 DIAGNOSIS — E66.3 OVERWEIGHT: ICD-10-CM

## 2023-05-22 DIAGNOSIS — Z98.891 HISTORY OF UTERINE SCAR FROM PREVIOUS SURGERY: ICD-10-CM

## 2023-05-22 DIAGNOSIS — Z30.09 ENCOUNTER FOR OTHER GENERAL COUNSELING AND ADVICE ON CONTRACEPTION: ICD-10-CM

## 2023-05-22 LAB
BILIRUB UR QL STRIP: NORMAL
CLARITY UR: CLEAR
COLLECTION METHOD: NORMAL
GLUCOSE UR-MCNC: NORMAL
HCG UR QL: 0.2 EU/DL
HGB UR QL STRIP.AUTO: ABNORMAL
KETONES UR-MCNC: NORMAL
LEUKOCYTE ESTERASE UR QL STRIP: NORMAL
NITRITE UR QL STRIP: NORMAL
PH UR STRIP: 5.5
PROT UR STRIP-MCNC: NORMAL
SP GR UR STRIP: <=1.005

## 2023-05-22 PROCEDURE — 81003 URINALYSIS AUTO W/O SCOPE: CPT | Mod: QW

## 2023-05-22 NOTE — HISTORY OF PRESENT ILLNESS
[Postpartum Follow Up] : postpartum follow up [Last Pap Date: ___] : Last Pap Date: [unfilled] [Delivery Date: ___] : on [unfilled] [Primary C/S] : delivered by  section [Female] : Delivery History: baby girl [Wt. ___] : weighing [unfilled] [Breastfeeding] : not currently nursing [Resumed Menses] : has not resumed her menses [Resumed [place]] : has not resumed intercourse [Intended Contraception] : the patient does not intended to use contraception postpartum [Back Pain] : no back pain [Breast Pain] : no breast pain [Chest Pain] : no chest pain [Incisional Pain] : no incisional pain [Leg Pain] : no leg pain [Shortness of Breath] : no shortness of breath [Suicidal Ideation] : no suicidal ideation [Vaginal Discharge] : no vaginal discharge [Clean/Dry/Intact] : clean, dry and intact [Erythema] : not erythematous [Swelling] : not swollen [Dehiscence] : not dehisced [Healed] : healed [___ wks] : is [unfilled] weeks in size [None] : no vaginal bleeding [Normal] : the vagina was normal [Cervix Sample Taken] : cervical sample not taken for a Pap smear [Examination Of The Breasts] : breasts are normal [Doing Well] : is doing well [No Sign of Infection] : is showing no signs of infection [Excellent Pain Control] : has excellent pain control [FreeTextEntry8] : Postpartum and postop exam [FreeTextEntry9] : High risk pregnancy complicated by AMA, fibroid uterus, polyhydramnios,  labor [de-identified] : Primary emergency  section due to fetal bradycardia [de-identified] : Stable, patient still states she has tenderness of the abdomen and when pointing appears to be at the area of the large fibroid but states it is improving, denies nausea or vomiting and states that the support binder helps significantly with movement and activity, denies fever/chills/symptoms of infection, has not resumed menses but states she has irregular spotting [de-identified] : Abdomen soft, minimal tenderness over the area of large fibroid/uterus, no rebound or guarding, positive bowel sounds, incision clean/dry/intact with no evidence of infection, patient given strict pain and bleeding precautions but seems to be healing well [de-identified] : A: 38  for Postop/postpartum exam, amenorrhea-spotting, fibroid uterus, BMI 28 [de-identified] : P: Postpartum/postop exam done-healing well, safe sex practices, pain and bleeding precautions, contraception counseling done-patient wants nothing at this time, encourage healthy diet and activity as tolerated, follow-up for routine annual exam or as needed

## 2023-05-31 LAB — BACTERIA UR CULT: ABNORMAL

## 2023-06-01 DIAGNOSIS — N39.0 URINARY TRACT INFECTION, SITE NOT SPECIFIED: ICD-10-CM

## 2023-06-02 RX ORDER — SULFAMETHOXAZOLE AND TRIMETHOPRIM 800; 160 MG/1; MG/1
800-160 TABLET ORAL TWICE DAILY
Qty: 14 | Refills: 0 | Status: ACTIVE | COMMUNITY
Start: 2023-06-01 | End: 1900-01-01

## 2023-06-09 NOTE — OB SUMMARY
[Date: _____] : Date: [unfilled] [Gestational Age: _____] : Gestational Age: [unfilled] [FreeTextEntry1] : Pt is here for her 33 week OB check-up, pt has no complaints today. \par \par wt- 162 ht- 5 temp- 98.7 b/p- 95/80 pulse- 84 pro- neg glu- neg   [de-identified] : sm

## 2023-06-14 PROBLEM — D21.9 FIBROIDS: Status: ACTIVE | Noted: 2020-07-14

## 2023-06-14 PROBLEM — O26.899 PELVIC PAIN IN PREGNANCY: Status: ACTIVE | Noted: 2022-09-24

## 2023-06-14 PROBLEM — O09.529 SUPERVISION OF ELDERLY MULTIGRAVIDA: Status: ACTIVE | Noted: 2022-09-02

## 2023-06-14 PROBLEM — K21.9 ACID REFLUX: Status: ACTIVE | Noted: 2023-01-13

## 2023-06-14 PROBLEM — O09.90 SUPERVISION OF HIGH RISK PREGNANCY, ANTEPARTUM: Status: ACTIVE | Noted: 2022-09-20

## 2023-06-14 PROBLEM — Z3A.35 PREGNANCY WITH 35 COMPLETED WEEKS GESTATION: Status: ACTIVE | Noted: 2022-10-09

## 2023-06-14 PROBLEM — Z71.89 OTHER SPECIFIED COUNSELING: Status: ACTIVE | Noted: 2020-07-19

## 2023-06-14 PROBLEM — O26.899 RH NEGATIVE, ANTEPARTUM: Status: ACTIVE | Noted: 2022-09-25

## 2023-06-14 NOTE — OB SUMMARY
[Date: _____] : Date: [unfilled] [Gestational Age: _____] : Gestational Age: [unfilled] [FreeTextEntry1] : Pt is here for her 35 week OB check-up, pt states she has pelvic and vaginal pressure, clear discharge.\par \par wt- 161 ht- 5 '9 temp- 98.6 b/p- 105/77 pulse- 87 pro- neg glu- neg  [de-identified] : sm

## 2023-08-18 ENCOUNTER — APPOINTMENT (OUTPATIENT)
Dept: OBGYN | Facility: CLINIC | Age: 38
End: 2023-08-18

## 2024-06-18 ENCOUNTER — APPOINTMENT (OUTPATIENT)
Dept: OBGYN | Facility: CLINIC | Age: 39
End: 2024-06-18

## 2024-06-18 VITALS
BODY MASS INDEX: 29.45 KG/M2 | WEIGHT: 150 LBS | HEART RATE: 71 BPM | TEMPERATURE: 98.6 F | HEIGHT: 60 IN | DIASTOLIC BLOOD PRESSURE: 67 MMHG | SYSTOLIC BLOOD PRESSURE: 119 MMHG

## 2024-06-18 DIAGNOSIS — Z01.419 ENCOUNTER FOR GYNECOLOGICAL EXAMINATION (GENERAL) (ROUTINE) W/OUT ABNORMAL FINDINGS: ICD-10-CM

## 2024-06-18 LAB
HCG UR QL: NEGATIVE
QUALITY CONTROL: YES

## 2024-06-18 PROCEDURE — 81025 URINE PREGNANCY TEST: CPT

## 2024-06-18 PROCEDURE — 36415 COLL VENOUS BLD VENIPUNCTURE: CPT

## 2024-06-18 PROCEDURE — 99395 PREV VISIT EST AGE 18-39: CPT | Mod: 25

## 2024-06-18 PROCEDURE — 99213 OFFICE O/P EST LOW 20 MIN: CPT | Mod: 25

## 2024-06-18 RX ORDER — NORGESTIMATE AND ETHINYL ESTRADIOL 7DAYSX3 LO
0.18/0.215/0.25 KIT ORAL
Qty: 84 | Refills: 1 | Status: ACTIVE | COMMUNITY
Start: 2024-06-18 | End: 1900-01-01

## 2024-06-18 NOTE — HISTORY OF PRESENT ILLNESS
[Patient reported PAP Smear was normal] : Patient reported PAP Smear was normal [Y] : Positive pregnancy history [Normal Amount/Duration] :  normal amount and duration [Regular Cycle Intervals] : periods have been regular [Frequency: Q ___ days] : menstrual periods occur approximately every [unfilled] days [Menarche Age: ____] : age at menarche was [unfilled] [Currently Active] : currently active [Men] : men [No] : No [PapSmeardate] : 9/2022 [LMPDate] : 5/26/24 [PGxTotal] : 8 [PGxPremature] : 1 [PGHxAbortions] : 7 [[facility] Center[facility]] : 1 [PGHxABSpont] : 5 [PGxEctopic] : 2 [FreeTextEntry1] : 5/26/24

## 2024-06-21 LAB
C TRACH RRNA SPEC QL NAA+PROBE: NOT DETECTED
HPV HIGH+LOW RISK DNA PNL CVX: NOT DETECTED
N GONORRHOEA RRNA SPEC QL NAA+PROBE: NOT DETECTED
SOURCE AMPLIFICATION: NORMAL

## 2024-06-22 LAB
SOURCE AMPLIFICATION: NORMAL
T VAGINALIS RRNA SPEC QL NAA+PROBE: NOT DETECTED

## 2024-06-26 LAB — CYTOLOGY CVX/VAG DOC THIN PREP: NORMAL

## 2024-07-18 ENCOUNTER — NON-APPOINTMENT (OUTPATIENT)
Age: 39
End: 2024-07-18

## 2024-07-24 PROBLEM — Z3A.35 PREGNANCY WITH 35 COMPLETED WEEKS GESTATION: Status: RESOLVED | Noted: 2022-10-09 | Resolved: 2024-07-24

## 2024-07-24 PROBLEM — O26.899 RH NEGATIVE, ANTEPARTUM: Status: RESOLVED | Noted: 2022-09-25 | Resolved: 2024-07-24

## 2024-07-24 PROBLEM — O09.90 SUPERVISION OF HIGH RISK PREGNANCY, ANTEPARTUM: Status: RESOLVED | Noted: 2022-09-20 | Resolved: 2024-07-24

## 2024-07-24 PROBLEM — O26.899 PELVIC PAIN IN PREGNANCY: Status: RESOLVED | Noted: 2022-09-24 | Resolved: 2024-07-24

## 2024-07-24 PROBLEM — Z71.83 ENCOUNTER FOR NONPROCREATIVE GENETIC COUNSELING AND TESTING: Status: ACTIVE | Noted: 2023-01-13

## 2024-07-24 PROBLEM — E66.3 OVERWEIGHT: Status: ACTIVE | Noted: 2020-07-19

## 2024-07-24 PROBLEM — Z30.41 ENCOUNTER FOR BIRTH CONTROL PILLS MAINTENANCE: Status: ACTIVE | Noted: 2020-07-19

## 2024-07-24 PROBLEM — O09.519 AMA (ADVANCED MATERNAL AGE) PRIMIGRAVIDA 35+: Status: RESOLVED | Noted: 2022-10-18 | Resolved: 2024-07-24

## 2024-07-24 PROBLEM — N92.3 SPOTTING BETWEEN MENSES: Status: ACTIVE | Noted: 2023-01-31

## 2024-07-24 PROBLEM — Z80.41 FAMILY HISTORY OF MALIGNANT NEOPLASM OF OVARY: Status: ACTIVE | Noted: 2024-07-24

## 2024-07-24 PROBLEM — O09.529 SUPERVISION OF ELDERLY MULTIGRAVIDA: Status: RESOLVED | Noted: 2022-09-02 | Resolved: 2024-07-24

## 2024-12-13 ENCOUNTER — RX RENEWAL (OUTPATIENT)
Age: 39
End: 2024-12-13

## 2025-01-13 ENCOUNTER — NON-APPOINTMENT (OUTPATIENT)
Age: 40
End: 2025-01-13